# Patient Record
Sex: FEMALE | Race: WHITE | NOT HISPANIC OR LATINO | URBAN - METROPOLITAN AREA
[De-identification: names, ages, dates, MRNs, and addresses within clinical notes are randomized per-mention and may not be internally consistent; named-entity substitution may affect disease eponyms.]

---

## 2020-04-27 ENCOUNTER — INPATIENT (INPATIENT)
Facility: HOSPITAL | Age: 85
LOS: 3 days | Discharge: NURSING, REHAB OR LONG-TERM CARE | DRG: 535 | End: 2020-05-01
Attending: FAMILY MEDICINE | Admitting: FAMILY MEDICINE
Payer: MEDICARE

## 2020-04-27 VITALS
TEMPERATURE: 97 F | HEART RATE: 77 BPM | RESPIRATION RATE: 15 BRPM | SYSTOLIC BLOOD PRESSURE: 155 MMHG | DIASTOLIC BLOOD PRESSURE: 87 MMHG | OXYGEN SATURATION: 98 %

## 2020-04-27 DIAGNOSIS — S72.001A FRACTURE OF UNSPECIFIED PART OF NECK OF RIGHT FEMUR, INITIAL ENCOUNTER FOR CLOSED FRACTURE: ICD-10-CM

## 2020-04-27 DIAGNOSIS — I27.20 PULMONARY HYPERTENSION, UNSPECIFIED: ICD-10-CM

## 2020-04-27 DIAGNOSIS — J84.10 PULMONARY FIBROSIS, UNSPECIFIED: ICD-10-CM

## 2020-04-27 DIAGNOSIS — K59.00 CONSTIPATION, UNSPECIFIED: ICD-10-CM

## 2020-04-27 DIAGNOSIS — E11.9 TYPE 2 DIABETES MELLITUS WITHOUT COMPLICATIONS: ICD-10-CM

## 2020-04-27 DIAGNOSIS — Z29.9 ENCOUNTER FOR PROPHYLACTIC MEASURES, UNSPECIFIED: ICD-10-CM

## 2020-04-27 LAB
ALBUMIN SERPL ELPH-MCNC: 2.6 G/DL — LOW (ref 3.3–5)
ALP SERPL-CCNC: 74 U/L — SIGNIFICANT CHANGE UP (ref 40–120)
ALT FLD-CCNC: 21 U/L — SIGNIFICANT CHANGE UP (ref 12–78)
ANION GAP SERPL CALC-SCNC: 2 MMOL/L — LOW (ref 5–17)
APTT BLD: 29.1 SEC — SIGNIFICANT CHANGE UP (ref 28.5–37)
AST SERPL-CCNC: 19 U/L — SIGNIFICANT CHANGE UP (ref 15–37)
BASE EXCESS BLDV CALC-SCNC: 9.1 MMOL/L — HIGH (ref -2–2)
BILIRUB SERPL-MCNC: 0.6 MG/DL — SIGNIFICANT CHANGE UP (ref 0.2–1.2)
BLOOD GAS COMMENTS, VENOUS: SIGNIFICANT CHANGE UP
BUN SERPL-MCNC: 19 MG/DL — SIGNIFICANT CHANGE UP (ref 7–23)
CALCIUM SERPL-MCNC: 9 MG/DL — SIGNIFICANT CHANGE UP (ref 8.5–10.1)
CHLORIDE SERPL-SCNC: 107 MMOL/L — SIGNIFICANT CHANGE UP (ref 96–108)
CO2 SERPL-SCNC: 34 MMOL/L — HIGH (ref 22–31)
CREAT SERPL-MCNC: 0.64 MG/DL — SIGNIFICANT CHANGE UP (ref 0.5–1.3)
GLUCOSE SERPL-MCNC: 72 MG/DL — SIGNIFICANT CHANGE UP (ref 70–99)
HCO3 BLDV-SCNC: 30 MMOL/L — HIGH (ref 21–29)
HCT VFR BLD CALC: 32.1 % — LOW (ref 34.5–45)
HGB BLD-MCNC: 10.3 G/DL — LOW (ref 11.5–15.5)
HOROWITZ INDEX BLDV+IHG-RTO: 21 — SIGNIFICANT CHANGE UP
INR BLD: 1.33 RATIO — HIGH (ref 0.88–1.16)
MCHC RBC-ENTMCNC: 29.2 PG — SIGNIFICANT CHANGE UP (ref 27–34)
MCHC RBC-ENTMCNC: 32.1 GM/DL — SIGNIFICANT CHANGE UP (ref 32–36)
MCV RBC AUTO: 90.9 FL — SIGNIFICANT CHANGE UP (ref 80–100)
NRBC # BLD: 0 /100 WBCS — SIGNIFICANT CHANGE UP (ref 0–0)
PCO2 BLDV: 70 MMHG — HIGH (ref 35–50)
PH BLDV: 7.32 — LOW (ref 7.35–7.45)
PLATELET # BLD AUTO: 167 K/UL — SIGNIFICANT CHANGE UP (ref 150–400)
PO2 BLDV: <44 MMHG — SIGNIFICANT CHANGE UP (ref 25–45)
POTASSIUM SERPL-MCNC: 3.7 MMOL/L — SIGNIFICANT CHANGE UP (ref 3.5–5.3)
POTASSIUM SERPL-SCNC: 3.7 MMOL/L — SIGNIFICANT CHANGE UP (ref 3.5–5.3)
PROT SERPL-MCNC: 6.4 G/DL — SIGNIFICANT CHANGE UP (ref 6–8.3)
PROTHROM AB SERPL-ACNC: 15 SEC — HIGH (ref 10–12.9)
RBC # BLD: 3.53 M/UL — LOW (ref 3.8–5.2)
RBC # FLD: 15.2 % — HIGH (ref 10.3–14.5)
SAO2 % BLDV: 61 % — LOW (ref 67–88)
SARS-COV-2 RNA SPEC QL NAA+PROBE: SIGNIFICANT CHANGE UP
SODIUM SERPL-SCNC: 143 MMOL/L — SIGNIFICANT CHANGE UP (ref 135–145)
WBC # BLD: 7.8 K/UL — SIGNIFICANT CHANGE UP (ref 3.8–10.5)
WBC # FLD AUTO: 7.8 K/UL — SIGNIFICANT CHANGE UP (ref 3.8–10.5)

## 2020-04-27 PROCEDURE — 99285 EMERGENCY DEPT VISIT HI MDM: CPT

## 2020-04-27 PROCEDURE — 73502 X-RAY EXAM HIP UNI 2-3 VIEWS: CPT | Mod: 26,RT

## 2020-04-27 PROCEDURE — 93010 ELECTROCARDIOGRAM REPORT: CPT

## 2020-04-27 PROCEDURE — 99223 1ST HOSP IP/OBS HIGH 75: CPT

## 2020-04-27 PROCEDURE — 99223 1ST HOSP IP/OBS HIGH 75: CPT | Mod: GC,AI

## 2020-04-27 PROCEDURE — 71045 X-RAY EXAM CHEST 1 VIEW: CPT | Mod: 26

## 2020-04-27 PROCEDURE — 73552 X-RAY EXAM OF FEMUR 2/>: CPT | Mod: 26,RT

## 2020-04-27 RX ORDER — GLUCAGON INJECTION, SOLUTION 0.5 MG/.1ML
1 INJECTION, SOLUTION SUBCUTANEOUS ONCE
Refills: 0 | Status: DISCONTINUED | OUTPATIENT
Start: 2020-04-27 | End: 2020-05-01

## 2020-04-27 RX ORDER — ZINC SULFATE TAB 220 MG (50 MG ZINC EQUIVALENT) 220 (50 ZN) MG
220 TAB ORAL DAILY
Refills: 0 | Status: DISCONTINUED | OUTPATIENT
Start: 2020-04-27 | End: 2020-05-01

## 2020-04-27 RX ORDER — SODIUM CHLORIDE 9 MG/ML
1000 INJECTION, SOLUTION INTRAVENOUS
Refills: 0 | Status: DISCONTINUED | OUTPATIENT
Start: 2020-04-27 | End: 2020-04-28

## 2020-04-27 RX ORDER — PREGABALIN 225 MG/1
1000 CAPSULE ORAL DAILY
Refills: 0 | Status: DISCONTINUED | OUTPATIENT
Start: 2020-04-27 | End: 2020-05-01

## 2020-04-27 RX ORDER — GABAPENTIN 400 MG/1
100 CAPSULE ORAL DAILY
Refills: 0 | Status: DISCONTINUED | OUTPATIENT
Start: 2020-04-27 | End: 2020-05-01

## 2020-04-27 RX ORDER — SODIUM CHLORIDE 9 MG/ML
1000 INJECTION, SOLUTION INTRAVENOUS
Refills: 0 | Status: DISCONTINUED | OUTPATIENT
Start: 2020-04-27 | End: 2020-05-01

## 2020-04-27 RX ORDER — MORPHINE SULFATE 50 MG/1
2 CAPSULE, EXTENDED RELEASE ORAL EVERY 6 HOURS
Refills: 0 | Status: DISCONTINUED | OUTPATIENT
Start: 2020-04-27 | End: 2020-05-01

## 2020-04-27 RX ORDER — DEXTROSE 50 % IN WATER 50 %
25 SYRINGE (ML) INTRAVENOUS ONCE
Refills: 0 | Status: DISCONTINUED | OUTPATIENT
Start: 2020-04-27 | End: 2020-05-01

## 2020-04-27 RX ORDER — MORPHINE SULFATE 50 MG/1
2 CAPSULE, EXTENDED RELEASE ORAL ONCE
Refills: 0 | Status: DISCONTINUED | OUTPATIENT
Start: 2020-04-27 | End: 2020-04-27

## 2020-04-27 RX ORDER — TIOTROPIUM BROMIDE 18 UG/1
1 CAPSULE ORAL; RESPIRATORY (INHALATION) DAILY
Refills: 0 | Status: DISCONTINUED | OUTPATIENT
Start: 2020-04-27 | End: 2020-05-01

## 2020-04-27 RX ORDER — DEXTROSE 50 % IN WATER 50 %
15 SYRINGE (ML) INTRAVENOUS ONCE
Refills: 0 | Status: DISCONTINUED | OUTPATIENT
Start: 2020-04-27 | End: 2020-05-01

## 2020-04-27 RX ORDER — ALBUTEROL 90 UG/1
2 AEROSOL, METERED ORAL EVERY 6 HOURS
Refills: 0 | Status: DISCONTINUED | OUTPATIENT
Start: 2020-04-27 | End: 2020-05-01

## 2020-04-27 RX ORDER — DEXTROSE 50 % IN WATER 50 %
12.5 SYRINGE (ML) INTRAVENOUS ONCE
Refills: 0 | Status: DISCONTINUED | OUTPATIENT
Start: 2020-04-27 | End: 2020-05-01

## 2020-04-27 RX ORDER — ACETAMINOPHEN 500 MG
650 TABLET ORAL EVERY 6 HOURS
Refills: 0 | Status: DISCONTINUED | OUTPATIENT
Start: 2020-04-27 | End: 2020-05-01

## 2020-04-27 RX ORDER — TRAMADOL HYDROCHLORIDE 50 MG/1
25 TABLET ORAL EVERY 6 HOURS
Refills: 0 | Status: DISCONTINUED | OUTPATIENT
Start: 2020-04-27 | End: 2020-05-01

## 2020-04-27 RX ORDER — INSULIN LISPRO 100/ML
VIAL (ML) SUBCUTANEOUS
Refills: 0 | Status: DISCONTINUED | OUTPATIENT
Start: 2020-04-27 | End: 2020-05-01

## 2020-04-27 RX ORDER — LATANOPROST 0.05 MG/ML
1 SOLUTION/ DROPS OPHTHALMIC; TOPICAL AT BEDTIME
Refills: 0 | Status: DISCONTINUED | OUTPATIENT
Start: 2020-04-27 | End: 2020-05-01

## 2020-04-27 RX ORDER — ASCORBIC ACID 60 MG
500 TABLET,CHEWABLE ORAL DAILY
Refills: 0 | Status: DISCONTINUED | OUTPATIENT
Start: 2020-04-27 | End: 2020-05-01

## 2020-04-27 RX ADMIN — Medication 0: at 17:04

## 2020-04-27 RX ADMIN — LATANOPROST 1 DROP(S): 0.05 SOLUTION/ DROPS OPHTHALMIC; TOPICAL at 23:36

## 2020-04-27 RX ADMIN — MORPHINE SULFATE 2 MILLIGRAM(S): 50 CAPSULE, EXTENDED RELEASE ORAL at 12:29

## 2020-04-27 NOTE — H&P ADULT - PROBLEM SELECTOR PLAN 2
- Patient hx of diabetes. Will stop home medication and start low dose ISS with accucheks  - f/u hba1c   -cont to monitor

## 2020-04-27 NOTE — CONSULT NOTE ADULT - PROBLEM SELECTOR RECOMMENDATION 9
right hip fx - OP - OA - Pulm fibrosis - chronic lung disease - frailty - weakness -   DM and Pulm HTN  fall - right hip fx at [facility] -   spoke with DTR - got the hx from DTR  labs and imaging reviewed -   pain assessment  ortho eval pending  o2 support as noted - keep sat > 88 pct  I lisa if able to cooperate  extent of fibrosis is unknown - cxr shows chr disease - pt is minimally mobile at baseline -   pt is moderate to high risk for OR - discussed with the family and medical team  may need cardio eval - estefany op - hx of Pulm HTN and advanced age  monitor sat  will check VBG

## 2020-04-27 NOTE — H&P ADULT - PROBLEM SELECTOR PLAN 1
- Patient s/p fall on right hip at nursing home  - Right femur XR:  Right femur is intact.  - Right hip XR: Displaced intertrochanteric fracture right hip  - COVID 19 test, f/u  - Patient will be planned for surgery of right hip  - NPO after midnight  - Orthopedics consult, f/u recs - Patient s/p fall on right hip at nursing home  - Right femur XR:  Right femur is intact.  - Right hip XR: Displaced intertrochanteric fracture right hip  - COVID 19 test, f/u  - Patient will be planned for surgery of right hip  - NPO after midnight  - Ambulate with assistane  - Pain medication with tramadol 25mg q6 prn for moderate, morphine 2mg for severe pain  - Orthopedics consult, f/u recs

## 2020-04-27 NOTE — H&P ADULT - NSHPREVIEWOFSYSTEMS_GEN_ALL_CORE
CONSTITUTIONAL: denies fever, chills, fatigue, weakness  HEENT: denies blurred visions  SKIN: denies new lesions  CARDIOVASCULAR: denies chest pain, chest pressure, palpitations  RESPIRATORY: denies shortness of breath, sputum production  GASTROINTESTINAL: denies nausea, vomiting, diarrhea, abdominal pain  GENITOURINARY: denies dysuria, discharge  NEUROLOGICAL: denies numbness, headache, focal weakness  MUSCULOSKELETAL: admits to right hip pain  HEMATOLOGIC: denies gross bleeding, bruising  LYMPHATICS: denies extremity swelling

## 2020-04-27 NOTE — CONSULT NOTE ADULT - SUBJECTIVE AND OBJECTIVE BOX
Date/Time Patient Seen:  		  Referring MD:   Data Reviewed	       Patient is a 89y old  Female who presents with a chief complaint of Right hip injury (27 Apr 2020 13:13)      Subjective/HPI    in bed  seen and examined  vs and meds reviewed  labs reviewed  H and P reviewed  History and Physical:   Source of Information	Chart(s), Patient, Other Family Member  Outpatient Providers	PCP: Dr. Dixon Colon     Language:  · Patient/Family of Limited English Proficiency	Yes  · Language	[place]  · Please document name/ID of person providing translation, or document patient refusal	[id number]       History of Present Illness:  Reason for Admission: Right hip injury  History of Present Illness:   89 year old female with PMHx of pulmonary fibrosis, pulmonary HTN, T2DM, and constipation, BIBEMS to ED with right hip pain in setting of recent unwitnessed fall. Patient is a poor historian with little memory of the accident at the nursing home at [facility] in [place], [place]. Patient states the last thing she remembered was that she was using her walker to get to her room and found herself on the floor after. Patient states she thinks she hit her head but isn't sure. She states she found herself on the floor with severe pain in her right hip. Patient states this has never happened to her before. Of note patient is at [facility] in [place]. She denies headache, chest pain, fever and chills. Of note, patient was last hospitalized for Pneumonia in october 2019    PAST SURGICAL HISTORY:  No significant past surgical history.     Social History:  Social History (marital status, living situation, occupation, tobacco use, alcohol and drug use, and sexual history): Patient is at [facility]. She ambulates with a walker.  	Tobacco: Denies  	Alcohol: Denies  Illicit Drug use: Denies     Tobacco Screening:  · Core Measure Site	Yes  · Has the patient used tobacco in the past 30 days?	No    Risk Assessment:    Present on Admission:  Deep Venous Thrombosis	no  Pulmonary Embolus	no     Heart Failure:  Does this patient have a history of or has been diagnosed with heart failure? no.    ED Course: Vitals: Temp 97.4F oral, HR 77, /87, RR 15, O2 sat 98 on RA.  Labs: H/h 10.3/32.1, RDW 15.2, PT/INR 15/1.33, bicarb 34, anion gap 2, albumin 2.6  CXR: severe b/l intersitial fibrosis w/o focal infiltrate  Right femur XR:  Right femur is intact.  Right hip XR: Displaced intertrochanteric fracture right hip     PAST MEDICAL & SURGICAL HISTORY:  Constipation  Pulmonary hypertension  Pulmonary fibrosis  DM (diabetes mellitus)  No significant past surgical history        Medication list         MEDICATIONS  (STANDING):  ALBUTerol    90 MICROgram(s) HFA Inhaler 2 Puff(s) Inhalation every 6 hours  ascorbic acid 500 milliGRAM(s) Oral daily  cyanocobalamin 1000 MICROGram(s) Oral daily  dextrose 5%. 1000 milliLiter(s) (50 mL/Hr) IV Continuous <Continuous>  dextrose 50% Injectable 12.5 Gram(s) IV Push once  dextrose 50% Injectable 25 Gram(s) IV Push once  dextrose 50% Injectable 25 Gram(s) IV Push once  gabapentin 100 milliGRAM(s) Oral daily  insulin lispro (HumaLOG) corrective regimen sliding scale   SubCutaneous three times a day before meals  lactated ringers. 1000 milliLiter(s) (50 mL/Hr) IV Continuous <Continuous>  latanoprost 0.005% Ophthalmic Solution 1 Drop(s) Both EYES at bedtime  tiotropium 18 MICROgram(s) Capsule 1 Capsule(s) Inhalation daily  zinc sulfate 220 milliGRAM(s) Oral daily    MEDICATIONS  (PRN):  acetaminophen   Tablet .. 650 milliGRAM(s) Oral every 6 hours PRN Mild Pain (1 - 3)  bisacodyl Suppository 10 milliGRAM(s) Rectal daily PRN Constipation  dextrose 40% Gel 15 Gram(s) Oral once PRN Blood Glucose LESS THAN 70 milliGRAM(s)/deciliter  glucagon  Injectable 1 milliGRAM(s) IntraMuscular once PRN Glucose LESS THAN 70 milligrams/deciliter  morphine  - Injectable 2 milliGRAM(s) IV Push every 6 hours PRN Severe Pain (7 - 10)  traMADol 25 milliGRAM(s) Oral every 6 hours PRN Moderate Pain (4 - 6)         Vitals log        ICU Vital Signs Last 24 Hrs  T(C): 36.3 (27 Apr 2020 11:17), Max: 36.3 (27 Apr 2020 11:17)  T(F): 97.4 (27 Apr 2020 11:17), Max: 97.4 (27 Apr 2020 11:17)  HR: 77 (27 Apr 2020 11:17) (77 - 77)  BP: 155/87 (27 Apr 2020 11:17) (155/87 - 155/87)  BP(mean): --  ABP: --  ABP(mean): --  RR: 15 (27 Apr 2020 11:17) (15 - 15)  SpO2: 98% (27 Apr 2020 11:17) (98% - 98%)           Input and Output:  I&O's Detail      Lab Data                        10.3   7.80  )-----------( 167      ( 27 Apr 2020 12:00 )             32.1     04-27    143  |  107  |  19  ----------------------------<  72  3.7   |  34<H>  |  0.64    Ca    9.0      27 Apr 2020 12:00    TPro  6.4  /  Alb  2.6<L>  /  TBili  0.6  /  DBili  x   /  AST  19  /  ALT  21  /  AlkPhos  74  04-27            Review of Systems	    fall    Objective     Physical Examination    heart s1s2  lung dec BS  abd soft  head nc  extr no gross edema  verbal      Pertinent Lab findings & Imaging      Liriano:  NO   Adequate UO     I&O's Detail           Discussed with:     Cultures:	        Radiology  EXAM:  XR FEMUR 2 VIEWS RT                            PROCEDURE DATE:  04/27/2020          INTERPRETATION:  Right hip pain.    2 views right femur.    Impression: Displaced intertrochanteric fracture right hip described in a separate report. Remainder right femur is intact. Partially visualized fixation proximal tibia. Extensive arterial calcification.                SOO AQUINO M.D., ATTENDING RADIOLOGIST  This document has been electronically signed. Apr 27 2020  1:02PM

## 2020-04-27 NOTE — CONSULT NOTE ADULT - ASSESSMENT
Patient is admitted with a mechanical fall now status post right hip fracture planned for surgery. She is at increased risk for her planned surgery given her comorbidities including at least moderate aortic stenosis and pulmonary fibrosis.She has no evidence for active ischemia, heart failure, or dysrhythmias. She has no modifiable risk factors and is in optimal condition for her planned surgery        Recommend    Proceed with planned surgery using routine hemodynamic monitoring    Echocardiography can be considered postoperatively but will not change risk or management so as not necessary preoperatively    We'll follow closely with you

## 2020-04-27 NOTE — H&P ADULT - ATTENDING COMMENTS
Pt is high risk for intermediate risk procedure. Pt's records being sent from PCP. She had an ECHO done within the year. If results are recent enough she may proceed to the OR, otherwise she will need an updated ECHO. Pt is high risk for intermediate risk procedure. Pt's records being sent from PCP. She had an ECHO done within the year. If results are recent enough she may proceed to the OR, otherwise she will need an updated ECHO. Medical optimization pending cardio evaluation.

## 2020-04-27 NOTE — H&P ADULT - NSHPPHYSICALEXAM_GEN_ALL_CORE
T(C): 36.3 (04-27-20 @ 11:17), Max: 36.3 (04-27-20 @ 11:17)  HR: 77 (04-27-20 @ 11:17) (77 - 77)  BP: 155/87 (04-27-20 @ 11:17) (155/87 - 155/87)  RR: 15 (04-27-20 @ 11:17) (15 - 15)  SpO2: 98% (04-27-20 @ 11:17) (98% - 98%)    GENERAL: elderly frail female in mild distress, laying in bed with right hip pain  EYES: sclera clear, no exudates  ENMT: moist mucous membranes  NECK: supple, soft  LUNGS: good air entry bilaterally, clear to auscultation, no wheezing or rhonchi appreciated  HEART: soft S1/S2, regular rate and rhythm, no murmurs noted, no lower extremity edema  GASTROINTESTINAL: abdomen is soft, nontender, nondistended, normoactive bowel sounds  MUSCULOSKELETAL: RLE externally rotated and shortened, lower extremity with signs of muscle wasting  NEUROLOGIC: awake, alert, oriented x3, no obvious sensory deficits  PSYCHIATRIC: mood is good, affect is congruent, linear and logical thought process T(C): 36.3 (04-27-20 @ 11:17), Max: 36.3 (04-27-20 @ 11:17)  HR: 77 (04-27-20 @ 11:17) (77 - 77)  BP: 155/87 (04-27-20 @ 11:17) (155/87 - 155/87)  RR: 15 (04-27-20 @ 11:17) (15 - 15)  SpO2: 98% (04-27-20 @ 11:17) (98% - 98%)    GENERAL: elderly frail female in mild distress, laying in bed with right hip pain  EYES: sclera clear, no exudates  ENMT: moist mucous membranes  NECK: supple, soft  LUNGS: poor respiratory effort, clear to auscultation, no wheezing or rhonchi appreciated  HEART: soft S1/S2, regular rate and rhythm, 3/6 holosystolic murmur blowing in quality, no lower extremity edema  GASTROINTESTINAL: abdomen is soft, nontender, nondistended, normoactive bowel sounds  MUSCULOSKELETAL: RLE externally rotated and shortened, lower extremity with signs of muscle wasting  NEUROLOGIC: awake, alert, oriented x3, no obvious sensory deficits  PSYCHIATRIC: mood is good, affect is congruent, linear and logical thought process

## 2020-04-27 NOTE — ED PROVIDER NOTE - OBJECTIVE STATEMENT
89 yo white female with right hip pain since sustaining non syncopal fall yesterday. Landed on right hip. Pain developed immediately. Did not hit head. X-Rays were done which revealed hip fracture and thus sent patient here for further evaluation and management.

## 2020-04-27 NOTE — H&P ADULT - PROBLEM SELECTOR PLAN 3
- Patient with chronic hx of pulmonary hypertension 2/2 pulmonary fibrosis  - Patient does not follow a pulmonologist, currently on duonebs  - Will start albuterol and spiriva while in the hospital   -

## 2020-04-27 NOTE — H&P ADULT - NSICDXPASTMEDICALHX_GEN_ALL_CORE_FT
PAST MEDICAL HISTORY:  Constipation     DM (diabetes mellitus)     Pulmonary fibrosis     Pulmonary hypertension

## 2020-04-27 NOTE — H&P ADULT - NSHPSOCIALHISTORY_GEN_ALL_CORE
Patient is at [facility]. She ambulates with a walker.  Tobacco: Denies  Alcohol: Denies  Illicit Drug use: Denies

## 2020-04-27 NOTE — CONSULT NOTE ADULT - ASSESSMENT
A/P: 89y Female with R IT hip fracture  Pain control  NWB R LE, bedrest  NPO  IVF while NPO  Hold all chemical DVT ppx  FU labs/imaging  Ca/Vit D  FU COVID test  Outpt osteoporosis workup  Admit to medical team  Medical clearance/optimization for OR  Will discuss with attending and advise if plan changes  INCOMPLETE NOTE A/P: 89y Female with R IT hip fracture  Plan for OR tomorrow for IMN  Pain control  NWB R LE, bedrest  NPO  IVF while NPO  Hold all chemical DVT ppx  FU labs/imaging  Ca/Vit D  FU COVID test  Outpt osteoporosis workup  Admit to medical team  Medical clearance/optimization for OR  Will discuss with attending and advise if plan changes

## 2020-04-27 NOTE — ED ADULT NURSE NOTE - OBJECTIVE STATEMENT
pt to er s/p fall upon arrival is awake and alert has right leg and hip pain right leg shortened and rotated positive pedal pulse resp even unlabored pt is able to answer basic questions

## 2020-04-27 NOTE — ED ADULT NURSE REASSESSMENT NOTE - NS ED NURSE REASSESS COMMENT FT1
pt remains in er admitted to hospital given food encouraged to eat positioned for comfort awaiting bed assignment

## 2020-04-27 NOTE — CONSULT NOTE ADULT - SUBJECTIVE AND OBJECTIVE BOX
[facility] Consultation    CHIEF COMPLAINT: Patient is a 89y old  Female who presents with a chief complaint of Right hip injury (27 Apr 2020 15:39)      HPI:  89 year old female with PMHx of pulmonary fibrosis, pulmonary HTN, T2DM, Aortic Stenosis and mitral regurgitation, and constipation, BIBEMS to ED with right hip pain in setting of recent unwitnessed fall. Patient is a poor historian with little memory of the accident at the nursing home at [facility] in [place], [place]. Patient states the last thing she remembered was that she was using her walker to get to her room and found herself on the floor after. Patient states she thinks she hit her head but isn't sure. She states she found herself on the floor with severe pain in her right hip. Patient states this has never happened to her before. Of note patient is at [facility] in [place]. She denies headache, chest pain, fever and chills. Of note, patient was last hospitalized for Pneumonia in october 2019    ED Course: Vitals: Temp 97.4F oral, HR 77, /87, RR 15, O2 sat 98 on RA.  Labs: H/h 10.3/32.1, RDW 15.2, PT/INR 15/1.33, bicarb 34, anion gap 2, albumin 2.6  CXR: severe b/l intersitial fibrosis w/o focal infiltrate  Right femur XR:  Right femur is intact.  Right hip XR: Displaced intertrochanteric fracture right hip (27 Apr 2020 13:13)      PAST MEDICAL & SURGICAL HISTORY:  Constipation  Pulmonary hypertension  Pulmonary fibrosis  DM (diabetes mellitus)  No significant past surgical history      SOCIAL HISTORY: no tob/etoh    FAMILY HISTORY: no CAD      MEDICATIONS  (STANDING):  ALBUTerol    90 MICROgram(s) HFA Inhaler 2 Puff(s) Inhalation every 6 hours  ascorbic acid 500 milliGRAM(s) Oral daily  cyanocobalamin 1000 MICROGram(s) Oral daily  dextrose 5%. 1000 milliLiter(s) (50 mL/Hr) IV Continuous <Continuous>  dextrose 50% Injectable 12.5 Gram(s) IV Push once  dextrose 50% Injectable 25 Gram(s) IV Push once  dextrose 50% Injectable 25 Gram(s) IV Push once  gabapentin 100 milliGRAM(s) Oral daily  insulin lispro (HumaLOG) corrective regimen sliding scale   SubCutaneous three times a day before meals  lactated ringers. 1000 milliLiter(s) (50 mL/Hr) IV Continuous <Continuous>  latanoprost 0.005% Ophthalmic Solution 1 Drop(s) Both EYES at bedtime  tiotropium 18 MICROgram(s) Capsule 1 Capsule(s) Inhalation daily  zinc sulfate 220 milliGRAM(s) Oral daily    MEDICATIONS  (PRN):  acetaminophen   Tablet .. 650 milliGRAM(s) Oral every 6 hours PRN Mild Pain (1 - 3)  bisacodyl Suppository 10 milliGRAM(s) Rectal daily PRN Constipation  dextrose 40% Gel 15 Gram(s) Oral once PRN Blood Glucose LESS THAN 70 milliGRAM(s)/deciliter  glucagon  Injectable 1 milliGRAM(s) IntraMuscular once PRN Glucose LESS THAN 70 milligrams/deciliter  morphine  - Injectable 2 milliGRAM(s) IV Push every 6 hours PRN Severe Pain (7 - 10)  traMADol 25 milliGRAM(s) Oral every 6 hours PRN Moderate Pain (4 - 6)      Allergies    No Known Allergies    Intolerances        REVIEW OF SYSTEMS:    CONSTITUTIONAL: pos weakness, no fevers or chills  EYES: No visual changes, No diplopia  ENMT: No throat pain , No exudate  NECK: No pain or stiffness  RESPIRATORY: No cough, wheezing, hemoptysis; No shortness of breath  CARDIOVASCULAR: No chest pain or palpitations  GASTROINTESTINAL: No abdominal pain. No nausea, vomiting, or hematemesis; No diarrhea or constipation. No melena or hematochezia.  GENITOURINARY: No dysuria, frequency or hematuria  NEUROLOGICAL: No numbness   SKIN: No itching or rash  All other review of systems is negative unless indicated above    VITAL SIGNS:   Vital Signs Last 24 Hrs  T(C): 36.3 (27 Apr 2020 11:17), Max: 36.3 (27 Apr 2020 11:17)  T(F): 97.4 (27 Apr 2020 11:17), Max: 97.4 (27 Apr 2020 11:17)  HR: 84 (27 Apr 2020 17:03) (77 - 84)  BP: 171/77 (27 Apr 2020 17:03) (155/87 - 171/77)  BP(mean): --  RR: 18 (27 Apr 2020 17:03) (15 - 18)  SpO2: 98% (27 Apr 2020 17:03) (98% - 98%)          PHYSICAL EXAM:    Constitutional: NAD, frail  Eyes:  Pupils round, no lesions  ENMT: no exudate or erythema  Pulmonary: Non-labored, breath sounds are clear bilaterally, No wheezing, rales or rhonchi  Cardiovascular: PMI not palpable Regular S1 and soft S2, 3/6 mid syst murmer of AS, no rubs, gallops or clicks  Gastrointestinal: Bowel Sounds present, soft, nontender.   Lymph: No peripheral edema. No cervical lymphadenopathy.  Neurological: Alert, no focal deficits  Skin: No rashes. Changes of chronic venous stasis. No cyanosis.  Psych:  Mood & affect appropriate    LABS: All Labs Reviewed:                        10.3   7.80  )-----------( 167      ( 27 Apr 2020 12:00 )             32.1     27 Apr 2020 12:00    143    |  107    |  19     ----------------------------<  72     3.7     |  34     |  0.64     Ca    9.0        27 Apr 2020 12:00    TPro  6.4    /  Alb  2.6    /  TBili  0.6    /  DBili  x      /  AST  19     /  ALT  21     /  AlkPhos  74     27 Apr 2020 12:00    PT/INR - ( 27 Apr 2020 12:00 )   PT: 15.0 sec;   INR: 1.33 ratio         PTT - ( 27 Apr 2020 12:00 )  PTT:29.1 sec      ECG: SR, LVH, PRWP    < from: Xray Chest 1 View-PORTABLE IMMEDIATE (04.27.20 @ 12:57) >    EXAM:  XR CHEST PORTABLE IMMED 1V                            PROCEDURE DATE:  04/27/2020          INTERPRETATION:    Examination: XR CHEST IMMEDIATE    History: , pre op  No prior  Findings:  Heart magnified by projection. Atherosclerotic aorta. There is severe diffuse bilateral nearly symmetric interstitial fibrosis. No definite focal infiltrate. No bryan parenchymal consolidation or pleural effusion.    Impression:  1.  Severe bilateral interstitial fibrosis without gross focal infiltrate.      DISCRETE X-RAY DATA:  Percent of LEFT lung opacification: %  Percent of RIGHT lung opacification: %  Change in lung opacification from most recent x-ray (<=3 days): No Prior  Change from prior dated 3 or more days (same admission): No Prior                  SOO AQUINO M.D., ATTENDING RADIOLOGIST  This document has been electronically signed. Apr 27 2020 12:59PM                < end of copied text >

## 2020-04-27 NOTE — H&P ADULT - ASSESSMENT
89 year old female with PMHx of pulmonary fibrosis, pulmonary HTN, T2DM, and constipation, BIBEMS to ED with right hip pain in setting of recent unwitnessed fall. Admitted with displaced intertrochanteric fracture of the right hip.

## 2020-04-27 NOTE — CONSULT NOTE ADULT - SUBJECTIVE AND OBJECTIVE BOX
89y Female home ambulator with assistive devices presents c/o R hip pain and inability to ambulate s/p mechanical fall yesterday at nursing home. Admits to HS but denies any LOC. Denies numbness/tingling. Denies fever/chills. Denies pain/injury elsewhere. Admits to previous R Tibial Plateau ORIF by OSH. No other orthopedic concerns at this time.    HEALTH ISSUES - PROBLEM Dx:  Need for prophylactic measure: Need for prophylactic measure  Constipation: Constipation  Pulmonary fibrosis: Pulmonary fibrosis  Pulmonary hypertension: Pulmonary hypertension  DM (diabetes mellitus): DM (diabetes mellitus)  Closed fracture of right hip, initial encounter: Closed fracture of right hip, initial encounter    MEDICATIONS  (STANDING):  ALBUTerol    90 MICROgram(s) HFA Inhaler 2 Puff(s) Inhalation every 6 hours  ascorbic acid 500 milliGRAM(s) Oral daily  cyanocobalamin 1000 MICROGram(s) Oral daily  dextrose 5%. 1000 milliLiter(s) IV Continuous <Continuous>  dextrose 50% Injectable 12.5 Gram(s) IV Push once  dextrose 50% Injectable 25 Gram(s) IV Push once  dextrose 50% Injectable 25 Gram(s) IV Push once  gabapentin 100 milliGRAM(s) Oral daily  insulin lispro (HumaLOG) corrective regimen sliding scale   SubCutaneous three times a day before meals  lactated ringers. 1000 milliLiter(s) IV Continuous <Continuous>  latanoprost 0.005% Ophthalmic Solution 1 Drop(s) Both EYES at bedtime  tiotropium 18 MICROgram(s) Capsule 1 Capsule(s) Inhalation daily  zinc sulfate 220 milliGRAM(s) Oral daily    Allergies    No Known Allergies    Intolerances                            10.3   7.80  )-----------( 167      ( 27 Apr 2020 12:00 )             32.1     27 Apr 2020 12:00    143    |  107    |  19     ----------------------------<  72     3.7     |  34     |  0.64     Ca    9.0        27 Apr 2020 12:00    TPro  6.4    /  Alb  2.6    /  TBili  0.6    /  DBili  x      /  AST  19     /  ALT  21     /  AlkPhos  74     27 Apr 2020 12:00    PT/INR - ( 27 Apr 2020 12:00 )   PT: 15.0 sec;   INR: 1.33 ratio         PTT - ( 27 Apr 2020 12:00 )  PTT:29.1 sec  Vital Signs Last 24 Hrs  T(C): 36.3 (04-27-20 @ 11:17), Max: 36.3 (04-27-20 @ 11:17)  T(F): 97.4 (04-27-20 @ 11:17), Max: 97.4 (04-27-20 @ 11:17)  HR: 77 (04-27-20 @ 11:17) (77 - 77)  BP: 155/87 (04-27-20 @ 11:17) (155/87 - 155/87)  BP(mean): --  RR: 15 (04-27-20 @ 11:17) (15 - 15)  SpO2: 98% (04-27-20 @ 11:17) (98% - 98%)    Imaging: XR demonstrates displaced R IT hip fracture    Physical Exam  Gen: NAD  R LE: skin intact, healed scar over knee/ant tib; unable to SLR R LE, + log roll R LE, +ttp hip/groin, no ttp elsewhere, +ehl/fhl/ta/gs function, no calf ttp, dp/pt pulse intact, compartments soft and compressible    Secondary Survey:   LLE/RUE/LUE: No TTP over bony prominences, SILT, palpable pulses, full/painless range of motion, compartments soft    Spine: No bony tenderness. No palpable stepoffs.

## 2020-04-27 NOTE — H&P ADULT - PROBLEM SELECTOR PLAN 6
IMPROVE VTE Individual Risk Assessment  RISK                                                                Points  [  ] Previous VTE                                                  3  [  ] Thrombophilia                                               2  [ x ] Lower limb paralysis                                      2        (unable to hold up >15 seconds)    [  ] Current Cancer                                              2         (within 6 months)  [  ] Immobilization > 24 hrs                                1  [  ] ICU/CCU stay > 24 hours                              1  [ x ] Age > 60                                                      1  IMPROVE VTE Score _____3____  IMPROVE Score 0-1: Low Risk, No VTE prophylaxis required for most patients, encourage ambulation.   IMPROVE Score 2-3: At risk, pharmacologic VTE prophylaxis is indicated for most patients (in the absence of a contraindication)  IMPROVE Score > or = 4: High Risk, pharmacologic VTE prophylaxis is indicated for most patients (in the absence of a contraindication)  DVT ppx: SCDs pre-op, can start ppx post-op per Ortho

## 2020-04-27 NOTE — H&P ADULT - PROBLEM SELECTOR PLAN 5
Pt may be planned for surgery tomorrow, will hold anticoagulation. SCD's    IMPROVE VTE Individual Risk Assessment  RISK                                                                Points  [  ] Previous VTE                                                  3  [  ] Thrombophilia                                               2  [ x ] Lower limb paralysis                                      2        (unable to hold up >15 seconds)    [  ] Current Cancer                                              2         (within 6 months)  [  ] Immobilization > 24 hrs                                1  [  ] ICU/CCU stay > 24 hours                              1  [ x ] Age > 60                                                      1  IMPROVE VTE Score _____3____  IMPROVE Score 0-1: Low Risk, No VTE prophylaxis required for most patients, encourage ambulation.   IMPROVE Score 2-3: At risk, pharmacologic VTE prophylaxis is indicated for most patients (in the absence of a contraindication)  IMPROVE Score > or = 4: High Risk, pharmacologic VTE prophylaxis is indicated for most patients (in the absence of a contraindication)  DVT ppx: SCDs pre-op, can start ppx post-op per Ortho

## 2020-04-27 NOTE — ED PROVIDER NOTE - CONSTITUTIONAL, MLM
normal... Weak and thin appearing elderly female, awake, alert, oriented to person, place, in mild apparent distress.

## 2020-04-28 LAB
A1C WITH ESTIMATED AVERAGE GLUCOSE RESULT: 5.9 % — HIGH (ref 4–5.6)
ALBUMIN SERPL ELPH-MCNC: 2.5 G/DL — LOW (ref 3.3–5)
ALP SERPL-CCNC: 76 U/L — SIGNIFICANT CHANGE UP (ref 40–120)
ALT FLD-CCNC: 19 U/L — SIGNIFICANT CHANGE UP (ref 12–78)
ANION GAP SERPL CALC-SCNC: 6 MMOL/L — SIGNIFICANT CHANGE UP (ref 5–17)
ANION GAP SERPL CALC-SCNC: 8 MMOL/L — SIGNIFICANT CHANGE UP (ref 5–17)
APPEARANCE UR: CLEAR — SIGNIFICANT CHANGE UP
APTT BLD: 28 SEC — LOW (ref 28.5–37)
APTT BLD: 28.1 SEC — LOW (ref 28.5–37)
AST SERPL-CCNC: 19 U/L — SIGNIFICANT CHANGE UP (ref 15–37)
BASOPHILS # BLD AUTO: 0.04 K/UL — SIGNIFICANT CHANGE UP (ref 0–0.2)
BASOPHILS NFR BLD AUTO: 0.5 % — SIGNIFICANT CHANGE UP (ref 0–2)
BILIRUB SERPL-MCNC: 1 MG/DL — SIGNIFICANT CHANGE UP (ref 0.2–1.2)
BILIRUB UR-MCNC: NEGATIVE — SIGNIFICANT CHANGE UP
BUN SERPL-MCNC: 17 MG/DL — SIGNIFICANT CHANGE UP (ref 7–23)
BUN SERPL-MCNC: 18 MG/DL — SIGNIFICANT CHANGE UP (ref 7–23)
CALCIUM SERPL-MCNC: 8.7 MG/DL — SIGNIFICANT CHANGE UP (ref 8.5–10.1)
CALCIUM SERPL-MCNC: 8.7 MG/DL — SIGNIFICANT CHANGE UP (ref 8.5–10.1)
CHLORIDE SERPL-SCNC: 103 MMOL/L — SIGNIFICANT CHANGE UP (ref 96–108)
CHLORIDE SERPL-SCNC: 104 MMOL/L — SIGNIFICANT CHANGE UP (ref 96–108)
CO2 SERPL-SCNC: 29 MMOL/L — SIGNIFICANT CHANGE UP (ref 22–31)
CO2 SERPL-SCNC: 34 MMOL/L — HIGH (ref 22–31)
COLOR SPEC: YELLOW — SIGNIFICANT CHANGE UP
CREAT SERPL-MCNC: 0.6 MG/DL — SIGNIFICANT CHANGE UP (ref 0.5–1.3)
CREAT SERPL-MCNC: 0.61 MG/DL — SIGNIFICANT CHANGE UP (ref 0.5–1.3)
DIFF PNL FLD: NEGATIVE — SIGNIFICANT CHANGE UP
EOSINOPHIL # BLD AUTO: 0.22 K/UL — SIGNIFICANT CHANGE UP (ref 0–0.5)
EOSINOPHIL NFR BLD AUTO: 2.6 % — SIGNIFICANT CHANGE UP (ref 0–6)
ESTIMATED AVERAGE GLUCOSE: 123 MG/DL — HIGH (ref 68–114)
GLUCOSE SERPL-MCNC: 118 MG/DL — HIGH (ref 70–99)
GLUCOSE SERPL-MCNC: 119 MG/DL — HIGH (ref 70–99)
GLUCOSE UR QL: NEGATIVE — SIGNIFICANT CHANGE UP
HCT VFR BLD CALC: 33.6 % — LOW (ref 34.5–45)
HCT VFR BLD CALC: 33.6 % — LOW (ref 34.5–45)
HGB BLD-MCNC: 10.8 G/DL — LOW (ref 11.5–15.5)
HGB BLD-MCNC: 10.9 G/DL — LOW (ref 11.5–15.5)
IMM GRANULOCYTES NFR BLD AUTO: 0.8 % — SIGNIFICANT CHANGE UP (ref 0–1.5)
INR BLD: 1.44 RATIO — HIGH (ref 0.88–1.16)
INR BLD: 1.47 RATIO — HIGH (ref 0.88–1.16)
KETONES UR-MCNC: NEGATIVE — SIGNIFICANT CHANGE UP
LEUKOCYTE ESTERASE UR-ACNC: ABNORMAL
LYMPHOCYTES # BLD AUTO: 0.98 K/UL — LOW (ref 1–3.3)
LYMPHOCYTES # BLD AUTO: 11.4 % — LOW (ref 13–44)
MCHC RBC-ENTMCNC: 29 PG — SIGNIFICANT CHANGE UP (ref 27–34)
MCHC RBC-ENTMCNC: 29.2 PG — SIGNIFICANT CHANGE UP (ref 27–34)
MCHC RBC-ENTMCNC: 32.1 GM/DL — SIGNIFICANT CHANGE UP (ref 32–36)
MCHC RBC-ENTMCNC: 32.4 GM/DL — SIGNIFICANT CHANGE UP (ref 32–36)
MCV RBC AUTO: 90.1 FL — SIGNIFICANT CHANGE UP (ref 80–100)
MCV RBC AUTO: 90.3 FL — SIGNIFICANT CHANGE UP (ref 80–100)
MONOCYTES # BLD AUTO: 0.62 K/UL — SIGNIFICANT CHANGE UP (ref 0–0.9)
MONOCYTES NFR BLD AUTO: 7.2 % — SIGNIFICANT CHANGE UP (ref 2–14)
NEUTROPHILS # BLD AUTO: 6.66 K/UL — SIGNIFICANT CHANGE UP (ref 1.8–7.4)
NEUTROPHILS NFR BLD AUTO: 77.5 % — HIGH (ref 43–77)
NITRITE UR-MCNC: NEGATIVE — SIGNIFICANT CHANGE UP
NRBC # BLD: 0 /100 WBCS — SIGNIFICANT CHANGE UP (ref 0–0)
NRBC # BLD: 0 /100 WBCS — SIGNIFICANT CHANGE UP (ref 0–0)
PH UR: 6 — SIGNIFICANT CHANGE UP (ref 5–8)
PLATELET # BLD AUTO: 173 K/UL — SIGNIFICANT CHANGE UP (ref 150–400)
PLATELET # BLD AUTO: 175 K/UL — SIGNIFICANT CHANGE UP (ref 150–400)
POTASSIUM SERPL-MCNC: 3.9 MMOL/L — SIGNIFICANT CHANGE UP (ref 3.5–5.3)
POTASSIUM SERPL-MCNC: 4.1 MMOL/L — SIGNIFICANT CHANGE UP (ref 3.5–5.3)
POTASSIUM SERPL-SCNC: 3.9 MMOL/L — SIGNIFICANT CHANGE UP (ref 3.5–5.3)
POTASSIUM SERPL-SCNC: 4.1 MMOL/L — SIGNIFICANT CHANGE UP (ref 3.5–5.3)
PROT SERPL-MCNC: 6.7 G/DL — SIGNIFICANT CHANGE UP (ref 6–8.3)
PROT UR-MCNC: 100
PROTHROM AB SERPL-ACNC: 16.3 SEC — HIGH (ref 10–12.9)
PROTHROM AB SERPL-ACNC: 16.7 SEC — HIGH (ref 10–12.9)
RBC # BLD: 3.72 M/UL — LOW (ref 3.8–5.2)
RBC # BLD: 3.73 M/UL — LOW (ref 3.8–5.2)
RBC # FLD: 15 % — HIGH (ref 10.3–14.5)
RBC # FLD: 15.2 % — HIGH (ref 10.3–14.5)
SODIUM SERPL-SCNC: 140 MMOL/L — SIGNIFICANT CHANGE UP (ref 135–145)
SODIUM SERPL-SCNC: 144 MMOL/L — SIGNIFICANT CHANGE UP (ref 135–145)
SP GR SPEC: 1.02 — SIGNIFICANT CHANGE UP (ref 1.01–1.02)
UROBILINOGEN FLD QL: NEGATIVE — SIGNIFICANT CHANGE UP
WBC # BLD: 7.11 K/UL — SIGNIFICANT CHANGE UP (ref 3.8–10.5)
WBC # BLD: 8.59 K/UL — SIGNIFICANT CHANGE UP (ref 3.8–10.5)
WBC # FLD AUTO: 7.11 K/UL — SIGNIFICANT CHANGE UP (ref 3.8–10.5)
WBC # FLD AUTO: 8.59 K/UL — SIGNIFICANT CHANGE UP (ref 3.8–10.5)

## 2020-04-28 PROCEDURE — 93306 TTE W/DOPPLER COMPLETE: CPT | Mod: 26

## 2020-04-28 PROCEDURE — 71275 CT ANGIOGRAPHY CHEST: CPT | Mod: 26

## 2020-04-28 PROCEDURE — 99233 SBSQ HOSP IP/OBS HIGH 50: CPT | Mod: GC

## 2020-04-28 PROCEDURE — 99232 SBSQ HOSP IP/OBS MODERATE 35: CPT

## 2020-04-28 RX ORDER — PREGABALIN 225 MG/1
1 CAPSULE ORAL
Qty: 0 | Refills: 0 | DISCHARGE

## 2020-04-28 RX ORDER — ACETAMINOPHEN 500 MG
0 TABLET ORAL
Qty: 0 | Refills: 0 | DISCHARGE

## 2020-04-28 RX ORDER — HYDROCORTISONE 1 %
0 OINTMENT (GRAM) TOPICAL
Qty: 0 | Refills: 0 | DISCHARGE

## 2020-04-28 RX ORDER — BRINZOLAMIDE/BRIMONIDINE TARTRATE 10; 2 MG/ML; MG/ML
1 SUSPENSION/ DROPS OPHTHALMIC
Qty: 0 | Refills: 0 | DISCHARGE

## 2020-04-28 RX ORDER — CHOLECALCIFEROL (VITAMIN D3) 125 MCG
1 CAPSULE ORAL
Qty: 0 | Refills: 0 | DISCHARGE

## 2020-04-28 RX ORDER — ASCORBIC ACID 60 MG
1 TABLET,CHEWABLE ORAL
Qty: 0 | Refills: 0 | DISCHARGE

## 2020-04-28 RX ORDER — LATANOPROST 0.05 MG/ML
1 SOLUTION/ DROPS OPHTHALMIC; TOPICAL
Qty: 0 | Refills: 0 | DISCHARGE

## 2020-04-28 RX ORDER — MENTHOL AND METHYL SALICYLATE 10; 30 G/100G; G/100G
0 STICK TOPICAL
Qty: 0 | Refills: 0 | DISCHARGE

## 2020-04-28 RX ORDER — IPRATROPIUM/ALBUTEROL SULFATE 18-103MCG
3 AEROSOL WITH ADAPTER (GRAM) INHALATION
Qty: 0 | Refills: 0 | DISCHARGE

## 2020-04-28 RX ORDER — MAGNESIUM HYDROXIDE 400 MG/1
0 TABLET, CHEWABLE ORAL
Qty: 0 | Refills: 0 | DISCHARGE

## 2020-04-28 RX ORDER — ENOXAPARIN SODIUM 100 MG/ML
40 INJECTION SUBCUTANEOUS DAILY
Refills: 0 | Status: DISCONTINUED | OUTPATIENT
Start: 2020-04-28 | End: 2020-05-01

## 2020-04-28 RX ORDER — GABAPENTIN 400 MG/1
0 CAPSULE ORAL
Qty: 0 | Refills: 0 | DISCHARGE

## 2020-04-28 RX ORDER — ZINC SULFATE TAB 220 MG (50 MG ZINC EQUIVALENT) 220 (50 ZN) MG
0 TAB ORAL
Qty: 0 | Refills: 0 | DISCHARGE

## 2020-04-28 RX ADMIN — ENOXAPARIN SODIUM 40 MILLIGRAM(S): 100 INJECTION SUBCUTANEOUS at 17:13

## 2020-04-28 RX ADMIN — ALBUTEROL 2 PUFF(S): 90 AEROSOL, METERED ORAL at 17:14

## 2020-04-28 RX ADMIN — ALBUTEROL 2 PUFF(S): 90 AEROSOL, METERED ORAL at 07:18

## 2020-04-28 RX ADMIN — LATANOPROST 1 DROP(S): 0.05 SOLUTION/ DROPS OPHTHALMIC; TOPICAL at 21:31

## 2020-04-28 RX ADMIN — ALBUTEROL 2 PUFF(S): 90 AEROSOL, METERED ORAL at 21:32

## 2020-04-28 RX ADMIN — TIOTROPIUM BROMIDE 1 CAPSULE(S): 18 CAPSULE ORAL; RESPIRATORY (INHALATION) at 07:18

## 2020-04-28 RX ADMIN — ALBUTEROL 2 PUFF(S): 90 AEROSOL, METERED ORAL at 11:30

## 2020-04-28 RX ADMIN — TRAMADOL HYDROCHLORIDE 25 MILLIGRAM(S): 50 TABLET ORAL at 17:13

## 2020-04-28 RX ADMIN — Medication 1: at 17:13

## 2020-04-28 RX ADMIN — SODIUM CHLORIDE 50 MILLILITER(S): 9 INJECTION, SOLUTION INTRAVENOUS at 00:00

## 2020-04-28 NOTE — PROGRESS NOTE ADULT - SUBJECTIVE AND OBJECTIVE BOX
Patient is a 89y old  Female who presents with a chief complaint of Right hip injury (2020 08:23)        HPI:  89 year old female with PMHx of pulmonary fibrosis, pulmonary HTN, T2DM, Aortic Stenosis and mitral regurgitation, and constipation, BIBEMS to ED with right hip pain in setting of recent unwitnessed fall. Patient is a poor historian with little memory of the accident at the nursing home at [facility] in [place], [place]. Patient states the last thing she remembered was that she was using her walker to get to her room and found herself on the floor after. Patient states she thinks she hit her head but isn't sure. She states she found herself on the floor with severe pain in her right hip. Patient states this has never happened to her before. Of note patient is at [facility] in [place]. She denies headache, chest pain, fever and chills. Of note, patient was last hospitalized for Pneumonia in 2019    ED Course: Vitals: Temp 97.4F oral, HR 77, /87, RR 15, O2 sat 98 on RA.  Labs: H/h 10.3/32.1, RDW 15.2, PT/INR 15/1.33, bicarb 34, anion gap 2, albumin 2.6  CXR: severe b/l intersitial fibrosis w/o focal infiltrate  Right femur XR:  Right femur is intact.  Right hip XR: Displaced intertrochanteric fracture right hip (2020 13:13)      SUBJECTIVE & OBJECTIVE: Pt seen and examined at bedside. on 50% 02     PHYSICAL EXAM:  T(C): 36.4 (20 @ 05:05), Max: 37.2 (20 @ 20:22)  HR: 81 (20 @ 05:05) (77 - 97)  BP: 158/84 (20 @ 05:05) (155/87 - 171/77)  RR: 22 (20 @ 05:05) (15 - 40)  SpO2: 100% (20 @ 05:05) (82% - 100%)  Wt(kg): --   GENERAL: confused, on 50 % 02  HEAD:  Atraumatic, Normocephalic  NERVOUS SYSTEM:  Alert   CHEST/LUNG: decrease air entr yast the abses   HEART: Regular rate and rhythm; No murmurs, rubs, or gallops  ABDOMEN: Soft, Nontender, Nondistended; Bowel sounds present  EXTREMITIES:  right hup fracture      MEDICATIONS  (STANDING):  ALBUTerol    90 MICROgram(s) HFA Inhaler 2 Puff(s) Inhalation every 6 hours  ascorbic acid 500 milliGRAM(s) Oral daily  cyanocobalamin 1000 MICROGram(s) Oral daily  dextrose 5%. 1000 milliLiter(s) (50 mL/Hr) IV Continuous <Continuous>  dextrose 50% Injectable 12.5 Gram(s) IV Push once  dextrose 50% Injectable 25 Gram(s) IV Push once  dextrose 50% Injectable 25 Gram(s) IV Push once  gabapentin 100 milliGRAM(s) Oral daily  insulin lispro (HumaLOG) corrective regimen sliding scale   SubCutaneous three times a day before meals  lactated ringers. 1000 milliLiter(s) (50 mL/Hr) IV Continuous <Continuous>  latanoprost 0.005% Ophthalmic Solution 1 Drop(s) Both EYES at bedtime  tiotropium 18 MICROgram(s) Capsule 1 Capsule(s) Inhalation daily  zinc sulfate 220 milliGRAM(s) Oral daily    MEDICATIONS  (PRN):  acetaminophen   Tablet .. 650 milliGRAM(s) Oral every 6 hours PRN Mild Pain (1 - 3)  bisacodyl Suppository 10 milliGRAM(s) Rectal daily PRN Constipation  dextrose 40% Gel 15 Gram(s) Oral once PRN Blood Glucose LESS THAN 70 milliGRAM(s)/deciliter  glucagon  Injectable 1 milliGRAM(s) IntraMuscular once PRN Glucose LESS THAN 70 milligrams/deciliter  morphine  - Injectable 2 milliGRAM(s) IV Push every 6 hours PRN Severe Pain (7 - 10)  traMADol 25 milliGRAM(s) Oral every 6 hours PRN Moderate Pain (4 - 6)      LABS:                        10.8   7.11  )-----------( 173      ( 2020 04:58 )             33.6         144  |  104  |  18  ----------------------------<  118<H>  4.1   |  34<H>  |  0.60    Ca    8.7      2020 04:58    TPro  6.4  /  Alb  2.6<L>  /  TBili  0.6  /  DBili  x   /  AST  19  /  ALT  21  /  AlkPhos  74  04-27    PT/INR - ( 2020 04:58 )   PT: 16.3 sec;   INR: 1.44 ratio         PTT - ( 2020 04:58 )  PTT:28.1 sec  Urinalysis Basic - ( 2020 08:00 )    Color: Yellow / Appearance: Clear / S.020 / pH: x  Gluc: x / Ketone: Negative  / Bili: Negative / Urobili: Negative   Blood: x / Protein: 100 / Nitrite: Negative   Leuk Esterase: Small / RBC: Negative /HPF / WBC 11-25   Sq Epi: x / Non Sq Epi: Occasional / Bacteria: Few        CAPILLARY BLOOD GLUCOSE      POCT Blood Glucose.: 116 mg/dL (2020 06:13)  POCT Blood Glucose.: 118 mg/dL (2020 21:21)  POCT Blood Glucose.: 152 mg/dL (2020 19:20)  POCT Blood Glucose.: 140 mg/dL (2020 16:58)  POCT Blood Glucose.: 194 mg/dL (2020 16:56)  POCT Blood Glucose.: 66 mg/dL (2020 16:08)      CAPILLARY BLOOD GLUCOSE      POCT Blood Glucose.: 116 mg/dL (2020 06:13)  POCT Blood Glucose.: 118 mg/dL (2020 21:21)  POCT Blood Glucose.: 152 mg/dL (2020 19:20)  POCT Blood Glucose.: 140 mg/dL (2020 16:58)  POCT Blood Glucose.: 194 mg/dL (2020 16:56)  POCT Blood Glucose.: 66 mg/dL (2020 16:08)    CAPILLARY BLOOD GLUCOSE      POCT Blood Glucose.: 116 mg/dL (2020 06:13)            RECENT CULTURES:      RADIOLOGY & ADDITIONAL TESTS:                        DVT/GI ppx  Discussed with pt @ bedside

## 2020-04-28 NOTE — CHART NOTE - NSCHARTNOTEFT_GEN_A_CORE
Nutrition Initial Assessment    Nutrition Consult Received: Yes [   ]  No [ x  ]    Reason for Initial Nutrition Assessment:pressure injury    Source of Information: Unable to conduct a fact to face interview due to limited contact restrictions related to pt's medical condition and isolation precautions. Information obtained from a phone call with [facility] and from the EMR.    Admitting Diagnosis: 89y Female admitted for Closed fracture of right hip, initial encounter    PAST MEDICAL & SURGICAL HISTORY:  Constipation  Pulmonary hypertension  Pulmonary fibrosis  DM (diabetes mellitus)  No significant past surgical history      Subjective Information: Pt admit with hip fx, NPO for OR. Hx DM, constipation, pulm hypertension, pulm fibrosis. Per EMR, st 2 sacral pressure injury, on vit c/zinc daily. Hba1c pending, but pt with episode of hypoglycemia x1 yesterday. Pt on venti mask at present. COVID- at admission. Pt speaks Japanese,  phone not feasible today. Spoke with RN at [facility]  pt 5', 84#, was on mechanical soft NCS, lactose free diet with glucerna supplements BID.        GI Issues:  Hx constipation- currently on dulcolax suppository. May need additional bowel regimen after OR with limited mobility and pain meds slowing gastric motility.    Current Nutrition Order:  PO Intake:   Good (%) [   ]    Fair (50-75%) [   ]    Poor (<50%) [   ]    Skin Integrity:see above    Labs:   04-28 Na140 mmol/L Glu 119 mg/dL<H> K+ 3.9 mmol/L Cr  0.61 mg/dL BUN 17 mg/dL Phos n/a   Alb 2.5 g/dL<L> PAB n/a           POCT Blood Glucose.: 116 mg/dL (04-28-20 @ 06:13)  POCT Blood Glucose.: 118 mg/dL (04-27-20 @ 21:21)  POCT Blood Glucose.: 152 mg/dL (04-27-20 @ 19:20)  POCT Blood Glucose.: 140 mg/dL (04-27-20 @ 16:58)  POCT Blood Glucose.: 194 mg/dL (04-27-20 @ 16:56)  POCT Blood Glucose.: 66 mg/dL (04-27-20 @ 16:08)    Medications:  MEDICATIONS  (STANDING):  ALBUTerol    90 MICROgram(s) HFA Inhaler 2 Puff(s) Inhalation every 6 hours  ascorbic acid 500 milliGRAM(s) Oral daily  cyanocobalamin 1000 MICROGram(s) Oral daily  dextrose 5%. 1000 milliLiter(s) (50 mL/Hr) IV Continuous <Continuous>  dextrose 50% Injectable 12.5 Gram(s) IV Push once  dextrose 50% Injectable 25 Gram(s) IV Push once  dextrose 50% Injectable 25 Gram(s) IV Push once  gabapentin 100 milliGRAM(s) Oral daily  insulin lispro (HumaLOG) corrective regimen sliding scale   SubCutaneous three times a day before meals  lactated ringers. 1000 milliLiter(s) (50 mL/Hr) IV Continuous <Continuous>  latanoprost 0.005% Ophthalmic Solution 1 Drop(s) Both EYES at bedtime  tiotropium 18 MICROgram(s) Capsule 1 Capsule(s) Inhalation daily  zinc sulfate 220 milliGRAM(s) Oral daily    MEDICATIONS  (PRN):  acetaminophen   Tablet .. 650 milliGRAM(s) Oral every 6 hours PRN Mild Pain (1 - 3)  bisacodyl Suppository 10 milliGRAM(s) Rectal daily PRN Constipation  dextrose 40% Gel 15 Gram(s) Oral once PRN Blood Glucose LESS THAN 70 milliGRAM(s)/deciliter  glucagon  Injectable 1 milliGRAM(s) IntraMuscular once PRN Glucose LESS THAN 70 milligrams/deciliter  morphine  - Injectable 2 milliGRAM(s) IV Push every 6 hours PRN Severe Pain (7 - 10)  traMADol 25 milliGRAM(s) Oral every 6 hours PRN Moderate Pain (4 - 6)    Admitted Anthropometrics:    5'84#, BMI 16    Nutrition Focused Physical Exam: Unable to complete due to limited isolation contact precautions at this time.     Estimated Energy Needs (_30_ kcal/kg- __35_ kcal/kg): 1150-1330cals  Estimated Protein Needs (1.2__ g/kg- _1.6__ g/kg): 45-60 g pro  Based on weight of:84#    [x  ] Nutrition Diagnosis:increased energy needs related to hip fx/OR  [  ] No active nutrition diagnosis at this time  [  ] Current medical condition precludes nutrition intervention    Goal:Pt will meet estimated needs within 48 hrs of OR    Nutrition Interventions:     Recommendations:Rec MVI daily  Monitor bowel function.  When diet avanced after OR, recommend Mechanical soft, glucerna BID, lactose free. Will follow glu for need for consistent CHO  Diet office made aware of need for lactose free/ mechanical soft diet.        RD to follow-up per protocol.

## 2020-04-28 NOTE — PROGRESS NOTE ADULT - SUBJECTIVE AND OBJECTIVE BOX
Patient seen and examined at bedside. Reports no acute complaints at this time. Pain is well controlled. Desaturated to 82% on RA; placed on Venti mask. CTA chest pending this AM.    PHYSICAL EXAM:  Vital Signs Last 24 Hrs  T(C): 36.4 (28 Apr 2020 05:05), Max: 37.2 (27 Apr 2020 20:22)  T(F): 97.5 (28 Apr 2020 05:05), Max: 98.9 (27 Apr 2020 20:22)  HR: 81 (28 Apr 2020 05:05) (77 - 97)  BP: 158/84 (28 Apr 2020 05:05) (155/87 - 171/77)  BP(mean): --  RR: 22 (28 Apr 2020 05:05) (15 - 40)  SpO2: 100% (28 Apr 2020 05:05) (82% - 100%)    Gen: NAD, AAOx3    Right Lower Extremity:  Leg shortened and externally rotated  +EHL/FHL/TA/GS  SILT L3-S1  +DP/PT Pulses  Compartments soft  No calf TTP B/L

## 2020-04-28 NOTE — PROGRESS NOTE ADULT - ASSESSMENT
A/P: 89F w/ R IT Fx  Analgesia  Hold all chemical DVT ppx for OR  NPO  IVF while NPO  NWB RLE  PT/OT  Encourage incentive spirometry  Medical optimization for OR  Plan for OR this AM for IMN  Will discuss with attending and advise if plan changes

## 2020-04-28 NOTE — PROGRESS NOTE ADULT - SUBJECTIVE AND OBJECTIVE BOX
[facility] -- Kenneth Roque, [place], [place], Camden Winter Savella  Office # [phone]      Follow Up:    Cardiac clearance, AS  Subjective/Observations:   No events overnight resting comfortably in bed.  No complaints of chest pain, dyspnea, or palpitations reported. No signs of orthopnea or PND. Wearing ventimask     REVIEW OF SYSTEMS: All other review of systems is negative unless indicated above    PAST MEDICAL & SURGICAL HISTORY:  Constipation  Pulmonary hypertension  Pulmonary fibrosis  DM (diabetes mellitus)  No significant past surgical history      MEDICATIONS  (STANDING):  ALBUTerol    90 MICROgram(s) HFA Inhaler 2 Puff(s) Inhalation every 6 hours  ascorbic acid 500 milliGRAM(s) Oral daily  cyanocobalamin 1000 MICROGram(s) Oral daily  dextrose 5%. 1000 milliLiter(s) (50 mL/Hr) IV Continuous <Continuous>  dextrose 50% Injectable 12.5 Gram(s) IV Push once  dextrose 50% Injectable 25 Gram(s) IV Push once  dextrose 50% Injectable 25 Gram(s) IV Push once  gabapentin 100 milliGRAM(s) Oral daily  insulin lispro (HumaLOG) corrective regimen sliding scale   SubCutaneous three times a day before meals  lactated ringers. 1000 milliLiter(s) (50 mL/Hr) IV Continuous <Continuous>  latanoprost 0.005% Ophthalmic Solution 1 Drop(s) Both EYES at bedtime  tiotropium 18 MICROgram(s) Capsule 1 Capsule(s) Inhalation daily  zinc sulfate 220 milliGRAM(s) Oral daily    MEDICATIONS  (PRN):  acetaminophen   Tablet .. 650 milliGRAM(s) Oral every 6 hours PRN Mild Pain (1 - 3)  bisacodyl Suppository 10 milliGRAM(s) Rectal daily PRN Constipation  dextrose 40% Gel 15 Gram(s) Oral once PRN Blood Glucose LESS THAN 70 milliGRAM(s)/deciliter  glucagon  Injectable 1 milliGRAM(s) IntraMuscular once PRN Glucose LESS THAN 70 milligrams/deciliter  morphine  - Injectable 2 milliGRAM(s) IV Push every 6 hours PRN Severe Pain (7 - 10)  traMADol 25 milliGRAM(s) Oral every 6 hours PRN Moderate Pain (4 - 6)      Allergies    No Known Allergies    Intolerances        Vital Signs Last 24 Hrs  T(C): 36.4 (28 Apr 2020 05:05), Max: 37.2 (27 Apr 2020 20:22)  T(F): 97.5 (28 Apr 2020 05:05), Max: 98.9 (27 Apr 2020 20:22)  HR: 81 (28 Apr 2020 05:05) (77 - 97)  BP: 158/84 (28 Apr 2020 05:05) (155/87 - 171/77)  BP(mean): --  RR: 22 (28 Apr 2020 05:05) (15 - 40)  SpO2: 100% (28 Apr 2020 05:05) (82% - 100%)    I&O's Summary    27 Apr 2020 07:01  -  28 Apr 2020 07:00  --------------------------------------------------------  IN: 0 mL / OUT: 200 mL / NET: -200 mL          PHYSICAL EXAM:  TELE: Off tele   Constitutional: NAD, awake and alert, well-developed  HEENT: Moist Mucous Membranes, Anicteric  Pulmonary: Non-labored, breath sounds are clear bilaterally, No wheezing, crackles or rhonchi  Cardiovascular: Regular, S1 and S2 nl, + murmur No rubs, gallops or clicks   Gastrointestinal: Bowel Sounds present, soft, nontender.   Lymph: No lymphadenopathy. No peripheral edema.  Skin: No visible rashes or ulcers.  Psych:  Mood & affect appropriate    LABS: All Labs Reviewed:                        10.8   7.11  )-----------( 173      ( 28 Apr 2020 04:58 )             33.6                         10.3   7.80  )-----------( 167      ( 27 Apr 2020 12:00 )             32.1     28 Apr 2020 04:58    144    |  104    |  18     ----------------------------<  118    4.1     |  34     |  0.60   27 Apr 2020 12:00    143    |  107    |  19     ----------------------------<  72     3.7     |  34     |  0.64     Ca    8.7        28 Apr 2020 04:58  Ca    9.0        27 Apr 2020 12:00    TPro  6.4    /  Alb  2.6    /  TBili  0.6    /  DBili  x      /  AST  19     /  ALT  21     /  AlkPhos  74     27 Apr 2020 12:00    PT/INR - ( 28 Apr 2020 04:58 )   PT: 16.3 sec;   INR: 1.44 ratio         PTT - ( 28 Apr 2020 04:58 )  PTT:28.1 sec         ECG:  < from: 12 Lead ECG (04.27.20 @ 12:21) >  Ventricular Rate 75 BPM    Atrial Rate 75 BPM    P-R Interval 152 ms    QRS Duration 88 ms    Q-T Interval 358 ms    QTC Calculation(Bezet) 399 ms    P Axis 43 degrees    R Axis -34 degrees    T Axis 7 degrees    Diagnosis Line Normal sinus rhythm  Left axis deviation  Moderate voltage criteria for LVH, may be normal variant  Abnormal ECG  No previous ECGs available  Confirmed by Sonido Ta MD ([id number]) on 4/27/2020 2:21:22 PM    < end of copied text >    Echo:  pending   Radiology:  < from: Xray Chest 1 View-PORTABLE IMMEDIATE (04.27.20 @ 12:57) >  EXAM:  XR CHEST PORTABLE IMMED 1V                            PROCEDURE DATE:  04/27/2020          INTERPRETATION:    Examination: XR CHEST IMMEDIATE    History: , pre op  No prior  Findings:  Heart magnified by projection. Atherosclerotic aorta. There is severe diffuse bilateral nearly symmetric interstitial fibrosis. No definite focal infiltrate. No bryan parenchymal consolidation or pleural effusion.    Impression:  1.  Severe bilateral interstitial fibrosis without gross focal infiltrate.      DISCRETE X-RAY DATA:  Percent of LEFT lung opacification: %  Percent of RIGHT lung opacification: %  Change in lung opacification from most recent x-ray (<=3 days): No Prior  Change from prior dated 3 or more days (same admission): No Prior                  SOO AQUINO M.D., ATTENDING RADIOLOGIST  This document has been electronically signed. Apr 27 2020 12:59PM                < end of copied text >

## 2020-04-28 NOTE — CHART NOTE - NSCHARTNOTEFT_GEN_A_CORE
Discussed with cardiologist, Dr. Ta. Preliminary findings of TTE (4/28) include: Normal LV, Mild AS, Mod TR, Pulm HTN. Formal report to follow.

## 2020-04-28 NOTE — PROGRESS NOTE ADULT - PROBLEM SELECTOR PLAN 1
- Patient s/p fall on right hip at nursing home  - Right femur XR:  Right femur is intact.  - Right hip XR: Displaced intertrochanteric fracture right hip  - COVID 19 negative  pt going for OR< given pt pulmonary fibrosis, ct angio negative for PE  pt is high risk for OR, pulm clearence needed

## 2020-04-28 NOTE — GOALS OF CARE CONVERSATION - ADVANCED CARE PLANNING - CONVERSATION DETAILS
spoke with Celeste Jurado, she consented to DNR DNI .States she understand it needs to be recinded for OR but will be inststuted post op.Dr Brown notified

## 2020-04-28 NOTE — PROGRESS NOTE ADULT - SUBJECTIVE AND OBJECTIVE BOX
notes reviewed, spoke at length in conjunction with anesthesia with pt family Pt with severe pulmonary hypertension. also aortic stenosis. diff maintaining appropriate sats with supplemental O2. very high risk for surgery, very possible/likely pt would remain intubated post-op. risk factors cannot be modified/mitigated. family elects to non-operative rx risk factors exceed potential benefits at this point. rx will focus on pain control, analgesics PRN, may be oob if tolerated from pain threshold stanpoint. NWB RLE.

## 2020-04-28 NOTE — PROGRESS NOTE ADULT - PROBLEM SELECTOR PLAN 1
frail and weak - ataxic gait - cognitive impairment  pulm fibrosis and chronic lung disease  on 50 pct fio2 - high risk for prolonged risk post op  will check CTA Chest this am  discussed prognosis and risks with the daughter  pt is full code  HOB elev  I lisa if able to cooperate   on Albuterol and Spiriva - Inhalers  VBG noted - reflective of Chronic Lung disease -   optimized to the fullest extent of Pulmonary medicine - remains a high risk for prolonged mechanical ventilation post operatively - this was discussed with Daughter who is the power of   pt is full code

## 2020-04-28 NOTE — PROGRESS NOTE ADULT - SUBJECTIVE AND OBJECTIVE BOX
Date/Time Patient Seen:  		  Referring MD:   Data Reviewed	       Patient is a 89y old  Female who presents with a chief complaint of Right hip injury (27 Apr 2020 17:09)      Subjective/HPI     PAST MEDICAL & SURGICAL HISTORY:  Constipation  Pulmonary hypertension  Pulmonary fibrosis  DM (diabetes mellitus)  No significant past surgical history        Medication list         MEDICATIONS  (STANDING):  ALBUTerol    90 MICROgram(s) HFA Inhaler 2 Puff(s) Inhalation every 6 hours  ascorbic acid 500 milliGRAM(s) Oral daily  cyanocobalamin 1000 MICROGram(s) Oral daily  dextrose 5%. 1000 milliLiter(s) (50 mL/Hr) IV Continuous <Continuous>  dextrose 50% Injectable 12.5 Gram(s) IV Push once  dextrose 50% Injectable 25 Gram(s) IV Push once  dextrose 50% Injectable 25 Gram(s) IV Push once  gabapentin 100 milliGRAM(s) Oral daily  insulin lispro (HumaLOG) corrective regimen sliding scale   SubCutaneous three times a day before meals  lactated ringers. 1000 milliLiter(s) (50 mL/Hr) IV Continuous <Continuous>  latanoprost 0.005% Ophthalmic Solution 1 Drop(s) Both EYES at bedtime  tiotropium 18 MICROgram(s) Capsule 1 Capsule(s) Inhalation daily  zinc sulfate 220 milliGRAM(s) Oral daily    MEDICATIONS  (PRN):  acetaminophen   Tablet .. 650 milliGRAM(s) Oral every 6 hours PRN Mild Pain (1 - 3)  bisacodyl Suppository 10 milliGRAM(s) Rectal daily PRN Constipation  dextrose 40% Gel 15 Gram(s) Oral once PRN Blood Glucose LESS THAN 70 milliGRAM(s)/deciliter  glucagon  Injectable 1 milliGRAM(s) IntraMuscular once PRN Glucose LESS THAN 70 milligrams/deciliter  morphine  - Injectable 2 milliGRAM(s) IV Push every 6 hours PRN Severe Pain (7 - 10)  traMADol 25 milliGRAM(s) Oral every 6 hours PRN Moderate Pain (4 - 6)         Vitals log        ICU Vital Signs Last 24 Hrs  T(C): 36.4 (28 Apr 2020 05:05), Max: 37.2 (27 Apr 2020 20:22)  T(F): 97.5 (28 Apr 2020 05:05), Max: 98.9 (27 Apr 2020 20:22)  HR: 81 (28 Apr 2020 05:05) (77 - 97)  BP: 158/84 (28 Apr 2020 05:05) (155/87 - 171/77)  BP(mean): --  ABP: --  ABP(mean): --  RR: 22 (28 Apr 2020 05:05) (15 - 40)  SpO2: 100% (28 Apr 2020 05:05) (82% - 100%)           Input and Output:  I&O's Detail    27 Apr 2020 07:01  -  28 Apr 2020 06:35  --------------------------------------------------------  IN:  Total IN: 0 mL    OUT:    Voided: 200 mL  Total OUT: 200 mL    Total NET: -200 mL          Lab Data                        10.8   7.11  )-----------( 173      ( 28 Apr 2020 04:58 )             33.6     04-28    144  |  104  |  18  ----------------------------<  118<H>  4.1   |  34<H>  |  0.60    Ca    8.7      28 Apr 2020 04:58    TPro  6.4  /  Alb  2.6<L>  /  TBili  0.6  /  DBili  x   /  AST  19  /  ALT  21  /  AlkPhos  74  04-27            Review of Systems	      Objective     Physical Examination    heart s1s2  lung dec BS  on V mask fio2 support  awake  verbal  confused      Pertinent Lab findings & Imaging      Heri:  NO   Adequate UO     I&O's Detail    27 Apr 2020 07:01  -  28 Apr 2020 06:35  --------------------------------------------------------  IN:  Total IN: 0 mL    OUT:    Voided: 200 mL  Total OUT: 200 mL    Total NET: -200 mL               Discussed with:     Cultures:	        Radiology

## 2020-04-29 PROCEDURE — 99232 SBSQ HOSP IP/OBS MODERATE 35: CPT

## 2020-04-29 RX ADMIN — ALBUTEROL 2 PUFF(S): 90 AEROSOL, METERED ORAL at 17:30

## 2020-04-29 RX ADMIN — ALBUTEROL 2 PUFF(S): 90 AEROSOL, METERED ORAL at 11:28

## 2020-04-29 RX ADMIN — Medication 500 MILLIGRAM(S): at 11:23

## 2020-04-29 RX ADMIN — PREGABALIN 1000 MICROGRAM(S): 225 CAPSULE ORAL at 11:23

## 2020-04-29 RX ADMIN — TRAMADOL HYDROCHLORIDE 25 MILLIGRAM(S): 50 TABLET ORAL at 09:27

## 2020-04-29 RX ADMIN — GABAPENTIN 100 MILLIGRAM(S): 400 CAPSULE ORAL at 11:23

## 2020-04-29 RX ADMIN — ALBUTEROL 2 PUFF(S): 90 AEROSOL, METERED ORAL at 05:48

## 2020-04-29 RX ADMIN — LATANOPROST 1 DROP(S): 0.05 SOLUTION/ DROPS OPHTHALMIC; TOPICAL at 21:15

## 2020-04-29 RX ADMIN — ENOXAPARIN SODIUM 40 MILLIGRAM(S): 100 INJECTION SUBCUTANEOUS at 11:23

## 2020-04-29 RX ADMIN — ZINC SULFATE TAB 220 MG (50 MG ZINC EQUIVALENT) 220 MILLIGRAM(S): 220 (50 ZN) TAB at 11:23

## 2020-04-29 RX ADMIN — TIOTROPIUM BROMIDE 1 CAPSULE(S): 18 CAPSULE ORAL; RESPIRATORY (INHALATION) at 05:48

## 2020-04-29 NOTE — PROGRESS NOTE ADULT - SUBJECTIVE AND OBJECTIVE BOX
Patient is a 89y old  Female who presents with a chief complaint of Right hip injury (2020 07:18)      FROM ADMISSION H+P:   HPI:  89 year old female with PMHx of pulmonary fibrosis, pulmonary HTN, T2DM, Aortic Stenosis and mitral regurgitation, and constipation, BIBEMS to ED with right hip pain in setting of recent unwitnessed fall. Patient is a poor historian with little memory of the accident at the nursing home at [facility] in [place], [place]. Patient states the last thing she remembered was that she was using her walker to get to her room and found herself on the floor after. Patient states she thinks she hit her head but isn't sure. She states she found herself on the floor with severe pain in her right hip. Patient states this has never happened to her before. Of note patient is at [facility] in [place]. She denies headache, chest pain, fever and chills. Of note, patient was last hospitalized for Pneumonia in 2019    ED Course: Vitals: Temp 97.4F oral, HR 77, /87, RR 15, O2 sat 98 on RA.  Labs: H/h 10.3/32.1, RDW 15.2, PT/INR 15/1.33, bicarb 34, anion gap 2, albumin 2.6  CXR: severe b/l intersitial fibrosis w/o focal infiltrate  Right femur XR:  Right femur is intact.  Right hip XR: Displaced intertrochanteric fracture right hip (2020 13:13)      ----  INTERVAL HPI/OVERNIGHT EVENTS: No acute overnight events noted.     ----  PAST MEDICAL & SURGICAL HISTORY:  Constipation  Pulmonary hypertension  Pulmonary fibrosis  DM (diabetes mellitus)  No significant past surgical history      FAMILY HISTORY:      Allergies    No Known Allergies    Intolerances    lactose (Unknown)      ----  REVIEW OF SYSTEMS:      ----  PHYSICAL EXAM:  GENERAL: confused, on VM  HEAD:  Atraumatic, Normocephalic  NERVOUS SYSTEM:  Alert   CHEST/LUNG: decrease air entr yast the abses   HEART: Regular rate and rhythm; No murmurs, rubs, or gallops  ABDOMEN: Soft, Nontender, Nondistended; Bowel sounds present  EXTREMITIES:  right hup fracture    T(C): 36.3 (20 @ 05:46), Max: 36.8 (20 @ 13:45)  HR: 84 (20 @ 05:46) (84 - 90)  BP: 152/70 (20 @ 05:46) (149/69 - 160/85)  RR: 18 (20 @ 05:46) (18 - 19)  SpO2: 98% (20 @ 05:46) (78% - 100%)  Wt(kg): --    ----  I&O's Summary    2020 07:01  -  2020 07:00  --------------------------------------------------------  IN: 450 mL / OUT: 400 mL / NET: 50 mL        LABS:                        10.9   8.59  )-----------( 175      ( 2020 09:51 )             33.6         140  |  103  |  17  ----------------------------<  119<H>  3.9   |  29  |  0.61    Ca    8.7      2020 09:51    TPro  6.7  /  Alb  2.5<L>  /  TBili  1.0  /  DBili  x   /  AST  19  /  ALT  19  /  AlkPhos  76  -28    PT/INR - ( 2020 09:51 )   PT: 16.7 sec;   INR: 1.47 ratio         PTT - ( 2020 09:51 )  PTT:28.0 sec  Urinalysis Basic - ( 2020 08:00 )    Color: Yellow / Appearance: Clear / S.020 / pH: x  Gluc: x / Ketone: Negative  / Bili: Negative / Urobili: Negative   Blood: x / Protein: 100 / Nitrite: Negative   Leuk Esterase: Small / RBC: Negative /HPF / WBC 11-25   Sq Epi: x / Non Sq Epi: Occasional / Bacteria: Few      CAPILLARY BLOOD GLUCOSE      POCT Blood Glucose.: 113 mg/dL (2020 07:18)  POCT Blood Glucose.: 117 mg/dL (2020 21:05)  POCT Blood Glucose.: 151 mg/dL (2020 16:38)  POCT Blood Glucose.: 123 mg/dL (2020 11:14) Patient is a 89y old  Female who presents with a chief complaint of Right hip injury (2020 07:18)      FROM ADMISSION H+P:   HPI:  89 year old female with PMHx of pulmonary fibrosis, pulmonary HTN, T2DM, Aortic Stenosis and mitral regurgitation, and constipation, BIBEMS to ED with right hip pain in setting of recent unwitnessed fall. Patient is a poor historian with little memory of the accident at the nursing home at [facility] in [place], [place]. Patient states the last thing she remembered was that she was using her walker to get to her room and found herself on the floor after. Patient states she thinks she hit her head but isn't sure. She states she found herself on the floor with severe pain in her right hip. Patient states this has never happened to her before. Of note patient is at [facility] in [place]. She denies headache, chest pain, fever and chills. Of note, patient was last hospitalized for Pneumonia in 2019    ED Course: Vitals: Temp 97.4F oral, HR 77, /87, RR 15, O2 sat 98 on RA.  Labs: H/h 10.3/32.1, RDW 15.2, PT/INR 15/1.33, bicarb 34, anion gap 2, albumin 2.6  CXR: severe b/l intersitial fibrosis w/o focal infiltrate  Right femur XR:  Right femur is intact.  Right hip XR: Displaced intertrochanteric fracture right hip (2020 13:13)      ----  INTERVAL HPI/OVERNIGHT EVENTS: No acute overnight events noted. Patient complaining of pain in R groin region, denies any CP, SOB, abd pain.    ----  PAST MEDICAL & SURGICAL HISTORY:  Constipation  Pulmonary hypertension  Pulmonary fibrosis  DM (diabetes mellitus)  No significant past surgical history        Allergies    No Known Allergies    Intolerances    lactose (Unknown)      ----  REVIEW OF SYSTEMS: as per HPI      ----  PHYSICAL EXAM:  GENERAL: confused, on VM  HEAD:  Atraumatic, Normocephalic  NERVOUS SYSTEM:  AAOx2, nonfocal  CHEST/LUNG: decrease air entry at the bases   HEART: Regular rate and rhythm; No murmurs, rubs, or gallops  ABDOMEN: Soft, Nontender, Nondistended; Bowel sounds present  EXTREMITIES:  right hip fracture- RLE shortened-externally rotated    T(C): 36.3 (20 @ 05:46), Max: 36.8 (20 @ 13:45)  HR: 84 (20 @ 05:46) (84 - 90)  BP: 152/70 (20 @ 05:46) (149/69 - 160/85)  RR: 18 (20 @ 05:46) (18 - 19)  SpO2: 98% (20 @ 05:46) (78% - 100%)  Wt(kg): --    ----  I&O's Summary    2020 07:01  -  2020 07:00  --------------------------------------------------------  IN: 450 mL / OUT: 400 mL / NET: 50 mL        LABS:                        10.9   8.59  )-----------( 175      ( 2020 09:51 )             33.6     04-28    140  |  103  |  17  ----------------------------<  119<H>  3.9   |  29  |  0.61    Ca    8.7      2020 09:51    TPro  6.7  /  Alb  2.5<L>  /  TBili  1.0  /  DBili  x   /  AST  19  /  ALT  19  /  AlkPhos  76  04-28    PT/INR - ( 2020 09:51 )   PT: 16.7 sec;   INR: 1.47 ratio         PTT - ( 2020 09:51 )  PTT:28.0 sec  Urinalysis Basic - ( 2020 08:00 )    Color: Yellow / Appearance: Clear / S.020 / pH: x  Gluc: x / Ketone: Negative  / Bili: Negative / Urobili: Negative   Blood: x / Protein: 100 / Nitrite: Negative   Leuk Esterase: Small / RBC: Negative /HPF / WBC 11-25   Sq Epi: x / Non Sq Epi: Occasional / Bacteria: Few      CAPILLARY BLOOD GLUCOSE      POCT Blood Glucose.: 113 mg/dL (2020 07:18)  POCT Blood Glucose.: 117 mg/dL (2020 21:05)  POCT Blood Glucose.: 151 mg/dL (2020 16:38)  POCT Blood Glucose.: 123 mg/dL (2020 11:14)

## 2020-04-29 NOTE — PROGRESS NOTE ADULT - PROBLEM SELECTOR PLAN 5
IMPROVE VTE Individual Risk Assessment  RISK                                                                Points  [  ] Previous VTE                                                  3  [  ] Thrombophilia                                               2  [ x ] Lower limb paralysis                                      2        (unable to hold up >15 seconds)    [  ] Current Cancer                                              2         (within 6 months)  [  ] Immobilization > 24 hrs                                1  [  ] ICU/CCU stay > 24 hours                              1  [ x ] Age > 60                                                      1  IMPROVE VTE Score _____3____  IMPROVE Score 0-1: Low Risk, No VTE prophylaxis required for most patients, encourage ambulation.   IMPROVE Score 2-3: At risk, pharmacologic VTE prophylaxis is indicated for most patients (in the absence of a contraindication)  IMPROVE Score > or = 4: High Risk, pharmacologic VTE prophylaxis is indicated for most patients (in the absence of a contraindication)  DVT ppx: Lovenox 40mg subq daily

## 2020-04-29 NOTE — PROGRESS NOTE ADULT - PROBLEM SELECTOR PLAN 1
- Patient s/p fall on right hip at nursing home  - Right femur XR:  Right femur is intact.  - Right hip XR: Displaced intertrochanteric fracture right hip  - COVID 19 negative  - family opting for non-operative treatment as patient very high risk for surgery with severe pulmonary hypertension. also aortic stenosis. diff maintaining appropriate sats with supplemental O2 per ortho - Patient s/p fall on right hip at nursing home  - Right femur XR:  Right femur is intact.  - Right hip XR: Displaced intertrochanteric fracture right hip  - COVID 19 negative  - family opting for non-operative treatment as patient very high risk for surgery with severe pulmonary hypertension. also aortic stenosis. diff maintaining appropriate sats with supplemental O2  -Ortho following

## 2020-04-29 NOTE — PROGRESS NOTE ADULT - SUBJECTIVE AND OBJECTIVE BOX
[facility] -- Kenneth Roque Grossman, [place], Camden Winter Sav[place], [place]: Office # [phone]    Follow Up:  Cardiac clearance, AS    Subjective/Observations: Patient seen and examined. Patient awake and alert, resting comfortably in bed. Tolerating nasal O2 via nasal canula. No complaints of chest pain, SOB, LE edema, cough. No signs of orthopnea or PND.    REVIEW OF SYSTEMS: All review of systems is negative for eye, ENT, GI, , allergic, dermatologic, musculoskeletal and neurologic except as described above    PAST MEDICAL & SURGICAL HISTORY:  Constipation  Pulmonary hypertension  Pulmonary fibrosis  DM (diabetes mellitus)  No significant past surgical history    MEDICATIONS  (STANDING):  ALBUTerol    90 MICROgram(s) HFA Inhaler 2 Puff(s) Inhalation every 6 hours  ascorbic acid 500 milliGRAM(s) Oral daily  cyanocobalamin 1000 MICROGram(s) Oral daily  dextrose 5%. 1000 milliLiter(s) (50 mL/Hr) IV Continuous <Continuous>  dextrose 50% Injectable 12.5 Gram(s) IV Push once  dextrose 50% Injectable 25 Gram(s) IV Push once  dextrose 50% Injectable 25 Gram(s) IV Push once  enoxaparin Injectable 40 milliGRAM(s) SubCutaneous daily  gabapentin 100 milliGRAM(s) Oral daily  insulin lispro (HumaLOG) corrective regimen sliding scale   SubCutaneous three times a day before meals  latanoprost 0.005% Ophthalmic Solution 1 Drop(s) Both EYES at bedtime  tiotropium 18 MICROgram(s) Capsule 1 Capsule(s) Inhalation daily  zinc sulfate 220 milliGRAM(s) Oral daily    MEDICATIONS  (PRN):  acetaminophen   Tablet .. 650 milliGRAM(s) Oral every 6 hours PRN Mild Pain (1 - 3)  bisacodyl Suppository 10 milliGRAM(s) Rectal daily PRN Constipation  dextrose 40% Gel 15 Gram(s) Oral once PRN Blood Glucose LESS THAN 70 milliGRAM(s)/deciliter  glucagon  Injectable 1 milliGRAM(s) IntraMuscular once PRN Glucose LESS THAN 70 milligrams/deciliter  morphine  - Injectable 2 milliGRAM(s) IV Push every 6 hours PRN Severe Pain (7 - 10)  traMADol 25 milliGRAM(s) Oral every 6 hours PRN Moderate Pain (4 - 6)    Allergies  No Known Allergies    Intolerances  lactose (Unknown)    Vital Signs Last 24 Hrs  T(C): 36.3 (29 Apr 2020 05:46), Max: 36.8 (28 Apr 2020 13:45)  T(F): 97.4 (29 Apr 2020 05:46), Max: 98.2 (28 Apr 2020 13:45)  HR: 84 (29 Apr 2020 05:46) (84 - 90)  BP: 152/70 (29 Apr 2020 05:46) (149/69 - 160/85)  BP(mean): --  RR: 18 (29 Apr 2020 05:46) (18 - 19)  SpO2: 98% (29 Apr 2020 05:46) (98% - 100%)    I&O's Summary  28 Apr 2020 07:01  -  29 Apr 2020 07:00  --------------------------------------------------------  IN: 450 mL / OUT: 400 mL / NET: 50 mL    TELE: Not on telemetry   PHYSICAL EXAM:  Appearance: NAD, no distress, alert, Frail   HEENT: Moist Mucous Membranes, Anicteric  Cardiovascular: Regular rate and rhythm, Normal S1 S2, No JVD, No murmurs, No rubs, gallops or clicks  Respiratory: Non-labored, Clear to auscultation, No rales, No rhonchi, No wheezing.   Gastrointestinal:  Soft, Non-tender, + BS  Neurologic: Non-focal  Skin: Warm and dry, No visible rashes or ulcers, No ecchymosis, No cyanosis  Musculoskeletal: Rt hip fracture No clubbing, No cyanosis, No joint swelling/tenderness  Psychiatry: Mood & affect appropriate  Lymph: No peripheral edema.     LABS: All Labs Reviewed:                        10.9   8.59  )-----------( 175      ( 28 Apr 2020 09:51 )             33.6                         10.8   7.11  )-----------( 173      ( 28 Apr 2020 04:58 )             33.6                         10.3   7.80  )-----------( 167      ( 27 Apr 2020 12:00 )             32.1     28 Apr 2020 09:51    140    |  103    |  17     ----------------------------<  119    3.9     |  29     |  0.61   28 Apr 2020 04:58    144    |  104    |  18     ----------------------------<  118    4.1     |  34     |  0.60   27 Apr 2020 12:00    143    |  107    |  19     ----------------------------<  72     3.7     |  34     |  0.64     Ca    8.7        28 Apr 2020 09:51  Ca    8.7        28 Apr 2020 04:58  Ca    9.0        27 Apr 2020 12:00    TPro  6.7    /  Alb  2.5    /  TBili  1.0    /  DBili  x      /  AST  19     /  ALT  19     /  AlkPhos  76     28 Apr 2020 09:51  TPro  6.4    /  Alb  2.6    /  TBili  0.6    /  DBili  x      /  AST  19     /  ALT  21     /  AlkPhos  74     27 Apr 2020 12:00    PT/INR - ( 28 Apr 2020 09:51 )   PT: 16.7 sec;   INR: 1.47 ratio    PTT - ( 28 Apr 2020 09:51 )  PTT:28.0 sec    12 Lead ECG:   Ventricular Rate 75 BPM  Atrial Rate 75 BPM  P-R Interval 152 ms  QRS Duration 88 ms  Q-T Interval 358 ms  QTC Calculation(Bezet) 399 ms  P Axis 43 degrees  R Axis -34 degrees  T Axis 7 degrees  Diagnosis Line Normal sinus rhythm  Left axis deviation  Moderate voltage criteria for LVH, may be normal variant  Abnormal ECG  No previous ECGs available  Confirmed by Sonido Ta MD ([id number]) on 4/27/2020 2:21:22 PM (04-27-20 @ 12:21)    ECHOCARDIOGRAM  < from: TTE Echo Complete w/o contrast w/ Doppler (04.28.20 @ 08:20) >  Dimensions:    LA 2.9       Normal Values: 2.0 - 4.0 cm    Ao 3.6        Normal Values: 2.0 - 3.8 cm  SEPTUM 1.0       Normal Values: 0.6 - 1.2 cm  PWT 1.2       Normal Values: 0.6 - 1.1 cm  LVIDd 3.7         Normal Values: 3.0 - 5.6 cm  LVIDs 2.1         Normal Values: 1.8 - 4.0 cm      OBSERVATIONS:  Technically difficult study  Mitral Valve: normal, mild MR.  Aortic Valve/Aorta: Calcified trileaflet aortic valve with likely mild aortic stenosis. Mild AI  Tricuspid Valve: Severe TR.  Pulmonic Valve: Mild PI  Left Atrium: normal  Right Atrium: Not well-visualized  Left Ventricle: normal LV size and systolic function, estimated LVEF of 65%.  Right Ventricle: Grossly normal size and systolic function.  Pericardium/Pleura: normal, no significant pericardial effusion.  Pulmonary/RV Pressure: estimated PA systolic pressure of 100 mmHg consistent with severe pulmonary hypertension  LV diastolic dysfunction is present    Conclusion:   Technically difficult study  Normal left ventricular internal dimensions and systolic function, estimated LVEF of 65%.   Grossly normal RV size and systolic function.    Calcified trileaflet aortic valve with likely mild aortic stenosis, mild AI.   Trace physiologic MR   Severe TR.    Estimated PA systolic pressure of 100 mmHg consistent with severe pulmonary hypertension  No significant pericardial effusion.    < end of copied text >    < from: CT Angio Chest w/ IV Cont (04.28.20 @ 08:58) >  FINDINGS:  LUNGS AND AIRWAYS: Patent central airways.  Severe bilateral interstitial fibrosis with subpleural predominance. Nonspecific dependent groundglass opacities at the lung bases could represent inflammatory/infectious process and/or pulmonary edema. Correlate clinically for active infection. Biapical scarring. No bryan lobar consolidation.  PLEURA: Trace left pleural effusion.  MEDIASTINUM AND TRINI: No lymphadenopathy.  VESSELS: Negative for pulmonary emboli. Prominent pulmonary arteries concerning for pulmonary hypertension. Atherosclerotic nonaneurysmal thoracic aorta..  HEART: Cardiomegaly with coronary artery calcification No pericardial effusion.  CHEST WALL AND LOWER NECK: Within normal limits.  VISUALIZED UPPER ABDOMEN: 4 mm nonobstructive left renal calculus.  BONES: Senescent/degenerative changes. Age-indeterminate mild superior endplate compression deformity upper thoracic vertebral body probably chronic. A nonspecific sclerotic focus involving a midthoracic vertebral body..    IMPRESSION:   Negative for pulmonary emboli.  Severe interstitial fibrosis.  Nonspecific dependent groundglass opacity at the lung bases may reflect inflammatory/infectious process and/or pulmonary edema.  Additional findings as discussed    < end of copied text >    < from: Xray Chest 1 View-PORTABLE IMMEDIATE (04.27.20 @ 12:57) >  Findings:  Heart magnified by projection. Atherosclerotic aorta. There is severe diffuse bilateral nearly symmetric interstitial fibrosis. No definite focal infiltrate. No bryan parenchymal consolidation or pleural effusion.    Impression:  1.  Severe bilateral interstitial fibrosis without gross focal infiltrate.    DISCRETE X-RAY DATA:  Percent of LEFT lung opacification: %  Percent of RIGHT lung opacification: %  Change in lung opacification from most recent x-ray (<=3 days): No Prior  Change from prior dated 3 or more days (same admission): No Prior    < end of copied text >

## 2020-04-29 NOTE — PROGRESS NOTE ADULT - ASSESSMENT
89 y/o f PMHx of pulmonary fibrosis, pulmonary HTN, DM2, AS & MR, and constipation, BIBEMS to ED with right hip pain in setting of recent unwitnessed fall. Found to have R Hip fx. Cardiology is being consulted for clearance    Preop  She is at increased risk for her planned surgery given her comorbidities including at least moderate aortic stenosis and pulmonary fibrosis. She has no evidence for active ischemia, heart failure, or dysrhythmias. She has no modifiable risk factors and is in optimal condition for her planned surgery    Prelim TTE shows Nl LV mild AS, Mor TR, Pul HTN  Proceed with planned surgery using routine hemodynamic monitoring    TTE pending 89 y/o f PMHx of pulmonary fibrosis, pulmonary HTN, DM2, AS & MR, and constipation, BIBEMS to ED with right hip pain in setting of recent unwitnessed fall. Found to have R Hip fx. Cardiology is being consulted for clearance    Right hip fracture   - Surgery cancelled   - TTE showed EF 65%, shows Nl LV, mild AS, mild AI, trace MR, severe TR, severe Pul HTN  - Management per ortho and primary    Pulmonary HTN   - Continue nebs  - Titrate O2 as tolerated    Diabetes Mellitus  - Monitor blood sugars ac and hs  - Management per primary       - Monitor and replete lytes, keep K>4, Mg>2.  - All other medical needs as per primary team.  - Other cardiovascular workup will depend on clinical course.  - Will continue to follow.      Mikayla Martinez MS FNP, [facility]  Nurse Practitioner- Cardiology   Spectra #[phone]

## 2020-04-29 NOTE — PROGRESS NOTE ADULT - PROBLEM SELECTOR PLAN 3
- Patient with chronic hx of pulmonary hypertension 2/2 pulmonary fibrosis  - Patient does not follow a pulmonologist, currently on duonebs  - albuterol and spiriva while in the hospital

## 2020-04-29 NOTE — PROGRESS NOTE ADULT - PROBLEM SELECTOR PLAN 1
in bed, seen and examined, vs and meds reviewed  surgery cancelled - palliative approach is being sought   family understands the risks of OR  Ortho eval and follow up noted  Opioids PRN for pain management  assist with needs  monitor for issues  placement - [facility] - vs Hospice  pt is DNR DNI  valv heart disease - frailty - weakness - pulm HTN - OP - OA - ataxic gait - pulm fib.  prognosis POOR

## 2020-04-29 NOTE — PROGRESS NOTE ADULT - SUBJECTIVE AND OBJECTIVE BOX
Date/Time Patient Seen:  		  Referring MD:   Data Reviewed	       Patient is a 89y old  Female who presents with a chief complaint of Right hip injury (28 Apr 2020 14:01)      Subjective/HPI     PAST MEDICAL & SURGICAL HISTORY:  Constipation  Pulmonary hypertension  Pulmonary fibrosis  DM (diabetes mellitus)  No significant past surgical history        Medication list         MEDICATIONS  (STANDING):  ALBUTerol    90 MICROgram(s) HFA Inhaler 2 Puff(s) Inhalation every 6 hours  ascorbic acid 500 milliGRAM(s) Oral daily  cyanocobalamin 1000 MICROGram(s) Oral daily  dextrose 5%. 1000 milliLiter(s) (50 mL/Hr) IV Continuous <Continuous>  dextrose 50% Injectable 12.5 Gram(s) IV Push once  dextrose 50% Injectable 25 Gram(s) IV Push once  dextrose 50% Injectable 25 Gram(s) IV Push once  enoxaparin Injectable 40 milliGRAM(s) SubCutaneous daily  gabapentin 100 milliGRAM(s) Oral daily  insulin lispro (HumaLOG) corrective regimen sliding scale   SubCutaneous three times a day before meals  latanoprost 0.005% Ophthalmic Solution 1 Drop(s) Both EYES at bedtime  tiotropium 18 MICROgram(s) Capsule 1 Capsule(s) Inhalation daily  zinc sulfate 220 milliGRAM(s) Oral daily    MEDICATIONS  (PRN):  acetaminophen   Tablet .. 650 milliGRAM(s) Oral every 6 hours PRN Mild Pain (1 - 3)  bisacodyl Suppository 10 milliGRAM(s) Rectal daily PRN Constipation  dextrose 40% Gel 15 Gram(s) Oral once PRN Blood Glucose LESS THAN 70 milliGRAM(s)/deciliter  glucagon  Injectable 1 milliGRAM(s) IntraMuscular once PRN Glucose LESS THAN 70 milligrams/deciliter  morphine  - Injectable 2 milliGRAM(s) IV Push every 6 hours PRN Severe Pain (7 - 10)  traMADol 25 milliGRAM(s) Oral every 6 hours PRN Moderate Pain (4 - 6)         Vitals log        ICU Vital Signs Last 24 Hrs  T(C): 36.3 (29 Apr 2020 05:46), Max: 36.8 (28 Apr 2020 13:45)  T(F): 97.4 (29 Apr 2020 05:46), Max: 98.2 (28 Apr 2020 13:45)  HR: 84 (29 Apr 2020 05:46) (84 - 90)  BP: 152/70 (29 Apr 2020 05:46) (149/69 - 160/85)  BP(mean): --  ABP: --  ABP(mean): --  RR: 18 (29 Apr 2020 05:46) (18 - 19)  SpO2: 98% (29 Apr 2020 05:46) (78% - 100%)           Input and Output:  I&O's Detail    28 Apr 2020 07:01  -  29 Apr 2020 07:00  --------------------------------------------------------  IN:    lactated ringers.: 450 mL  Total IN: 450 mL    OUT:    Voided: 400 mL  Total OUT: 400 mL    Total NET: 50 mL          Lab Data                        10.9   8.59  )-----------( 175      ( 28 Apr 2020 09:51 )             33.6     04-28    140  |  103  |  17  ----------------------------<  119<H>  3.9   |  29  |  0.61    Ca    8.7      28 Apr 2020 09:51    TPro  6.7  /  Alb  2.5<L>  /  TBili  1.0  /  DBili  x   /  AST  19  /  ALT  19  /  AlkPhos  76  04-28            Review of Systems	      Objective     Physical Examination    heart s1s2  lung dec BS      Pertinent Lab findings & Imaging      Heri:  NO   Adequate UO     I&O's Detail    28 Apr 2020 07:01  -  29 Apr 2020 07:00  --------------------------------------------------------  IN:    lactated ringers.: 450 mL  Total IN: 450 mL    OUT:    Voided: 400 mL  Total OUT: 400 mL    Total NET: 50 mL               Discussed with:     Cultures:	        Radiology

## 2020-04-30 LAB
ALBUMIN SERPL ELPH-MCNC: 2.3 G/DL — LOW (ref 3.3–5)
ALP SERPL-CCNC: 64 U/L — SIGNIFICANT CHANGE UP (ref 40–120)
ALT FLD-CCNC: 15 U/L — SIGNIFICANT CHANGE UP (ref 12–78)
ANION GAP SERPL CALC-SCNC: 10 MMOL/L — SIGNIFICANT CHANGE UP (ref 5–17)
AST SERPL-CCNC: 15 U/L — SIGNIFICANT CHANGE UP (ref 15–37)
BILIRUB SERPL-MCNC: 0.9 MG/DL — SIGNIFICANT CHANGE UP (ref 0.2–1.2)
BUN SERPL-MCNC: 26 MG/DL — HIGH (ref 7–23)
CALCIUM SERPL-MCNC: 8.5 MG/DL — SIGNIFICANT CHANGE UP (ref 8.5–10.1)
CHLORIDE SERPL-SCNC: 100 MMOL/L — SIGNIFICANT CHANGE UP (ref 96–108)
CO2 SERPL-SCNC: 33 MMOL/L — HIGH (ref 22–31)
CREAT SERPL-MCNC: 0.62 MG/DL — SIGNIFICANT CHANGE UP (ref 0.5–1.3)
GLUCOSE SERPL-MCNC: 100 MG/DL — HIGH (ref 70–99)
HCT VFR BLD CALC: 29.1 % — LOW (ref 34.5–45)
HGB BLD-MCNC: 9.2 G/DL — LOW (ref 11.5–15.5)
MCHC RBC-ENTMCNC: 29.4 PG — SIGNIFICANT CHANGE UP (ref 27–34)
MCHC RBC-ENTMCNC: 31.6 GM/DL — LOW (ref 32–36)
MCV RBC AUTO: 93 FL — SIGNIFICANT CHANGE UP (ref 80–100)
NRBC # BLD: 0 /100 WBCS — SIGNIFICANT CHANGE UP (ref 0–0)
PLATELET # BLD AUTO: 176 K/UL — SIGNIFICANT CHANGE UP (ref 150–400)
POTASSIUM SERPL-MCNC: 3.8 MMOL/L — SIGNIFICANT CHANGE UP (ref 3.5–5.3)
POTASSIUM SERPL-SCNC: 3.8 MMOL/L — SIGNIFICANT CHANGE UP (ref 3.5–5.3)
PROT SERPL-MCNC: 6.1 G/DL — SIGNIFICANT CHANGE UP (ref 6–8.3)
RBC # BLD: 3.13 M/UL — LOW (ref 3.8–5.2)
RBC # FLD: 15.5 % — HIGH (ref 10.3–14.5)
SODIUM SERPL-SCNC: 143 MMOL/L — SIGNIFICANT CHANGE UP (ref 135–145)
WBC # BLD: 6.51 K/UL — SIGNIFICANT CHANGE UP (ref 3.8–10.5)
WBC # FLD AUTO: 6.51 K/UL — SIGNIFICANT CHANGE UP (ref 3.8–10.5)

## 2020-04-30 PROCEDURE — 99232 SBSQ HOSP IP/OBS MODERATE 35: CPT

## 2020-04-30 RX ADMIN — PREGABALIN 1000 MICROGRAM(S): 225 CAPSULE ORAL at 11:57

## 2020-04-30 RX ADMIN — ZINC SULFATE TAB 220 MG (50 MG ZINC EQUIVALENT) 220 MILLIGRAM(S): 220 (50 ZN) TAB at 11:57

## 2020-04-30 RX ADMIN — ALBUTEROL 2 PUFF(S): 90 AEROSOL, METERED ORAL at 01:13

## 2020-04-30 RX ADMIN — Medication 650 MILLIGRAM(S): at 01:11

## 2020-04-30 RX ADMIN — MORPHINE SULFATE 2 MILLIGRAM(S): 50 CAPSULE, EXTENDED RELEASE ORAL at 06:07

## 2020-04-30 RX ADMIN — ALBUTEROL 2 PUFF(S): 90 AEROSOL, METERED ORAL at 06:12

## 2020-04-30 RX ADMIN — TIOTROPIUM BROMIDE 1 CAPSULE(S): 18 CAPSULE ORAL; RESPIRATORY (INHALATION) at 06:12

## 2020-04-30 RX ADMIN — LATANOPROST 1 DROP(S): 0.05 SOLUTION/ DROPS OPHTHALMIC; TOPICAL at 21:36

## 2020-04-30 RX ADMIN — GABAPENTIN 100 MILLIGRAM(S): 400 CAPSULE ORAL at 11:57

## 2020-04-30 RX ADMIN — ALBUTEROL 2 PUFF(S): 90 AEROSOL, METERED ORAL at 18:02

## 2020-04-30 RX ADMIN — ENOXAPARIN SODIUM 40 MILLIGRAM(S): 100 INJECTION SUBCUTANEOUS at 11:57

## 2020-04-30 RX ADMIN — Medication 500 MILLIGRAM(S): at 11:57

## 2020-04-30 RX ADMIN — ALBUTEROL 2 PUFF(S): 90 AEROSOL, METERED ORAL at 11:59

## 2020-04-30 NOTE — PROGRESS NOTE ADULT - ASSESSMENT
89 y/o f PMHx of pulmonary fibrosis, pulmonary HTN, DM2, AS & MR, and constipation, BIBEMS to ED with right hip pain in setting of recent unwitnessed fall. Found to have R Hip fx. Cardiology is being consulted for clearance    Right hip fracture   - Surgery cancelled   - TTE showed EF 65%, shows Nl LV, mild AS, mild AI, trace MR, severe TR, severe Pul HTN  - Management per ortho and primary    Pulmonary HTN   - Continue nebs  - Titrate O2 as tolerated    Diabetes Mellitus  - Monitor blood sugars ac and hs  - Management per primary       - Monitor and replete lytes, keep K>4, Mg>2.  - All other medical needs as per primary team.  - Other cardiovascular workup will depend on clinical course.  - Will continue to follow.      Mikayla Martinez MS FNP, [facility]  Nurse Practitioner- Cardiology   Spectra #[phone]

## 2020-04-30 NOTE — PROGRESS NOTE ADULT - PROBLEM SELECTOR PLAN 1
- Patient s/p fall on right hip at nursing home  - Right femur XR:  Right femur is intact.  - Right hip XR: Displaced intertrochanteric fracture right hip  - COVID 19 negative  - family opting for non-operative treatment as patient very high risk for surgery with severe pulmonary hypertension. also aortic stenosis. diff maintaining appropriate sats with supplemental O2- able to tolerate to NC today  -Ortho following

## 2020-04-30 NOTE — PROGRESS NOTE ADULT - PROBLEM SELECTOR PLAN 1
non operative management -   Opioids PRN for pain management  assist with needs  monitor for issues  placement - [facility] - vs Hospice  pt is DNR DNI  valv heart disease - frailty - weakness - pulm HTN - OP - OA - ataxic gait - pulm fib.  prognosis POOR.   on proventil for chr lung disease  dc planning

## 2020-04-30 NOTE — PROGRESS NOTE ADULT - SUBJECTIVE AND OBJECTIVE BOX
89 year old female with PMHx of pulmonary fibrosis, pulmonary HTN, T2DM, Aortic Stenosis and mitral regurgitation, and constipation, BIBEMS to ED with right hip pain in setting of recent unwitnessed fall. Patient is a poor historian with little memory of the accident at the nursing home at [facility] in [place], [place]. Patient states the last thing she remembered was that she was using her walker to get to her room and found herself on the floor after. Patient states she thinks she hit her head but isn't sure. She states she found herself on the floor with severe pain in her right hip. Patient states this has never happened to her before. Of note patient is at [facility] in [place]. She denies headache, chest pain, fever and chills. Of note, patient was last hospitalized for Pneumonia in october 2019    ED Course: Vitals: Temp 97.4F oral, HR 77, /87, RR 15, O2 sat 98 on RA.  Labs: H/h 10.3/32.1, RDW 15.2, PT/INR 15/1.33, bicarb 34, anion gap 2, albumin 2.6  CXR: severe b/l intersitial fibrosis w/o focal infiltrate  Right femur XR:  Right femur is intact.  Right hip XR: Displaced intertrochanteric fracture right hip (27 Apr 2020 13:13)      ----  INTERVAL HPI/OVERNIGHT EVENTS: No acute overnight events noted. Patient stating she is feeling better, denies any CP, SOB, abd pain.    ROS: All other review of systems is negative unless indicated above.      MEDICATIONS  (STANDING):  ALBUTerol    90 MICROgram(s) HFA Inhaler 2 Puff(s) Inhalation every 6 hours  ascorbic acid 500 milliGRAM(s) Oral daily  cyanocobalamin 1000 MICROGram(s) Oral daily  dextrose 5%. 1000 milliLiter(s) (50 mL/Hr) IV Continuous <Continuous>  dextrose 50% Injectable 12.5 Gram(s) IV Push once  dextrose 50% Injectable 25 Gram(s) IV Push once  dextrose 50% Injectable 25 Gram(s) IV Push once  enoxaparin Injectable 40 milliGRAM(s) SubCutaneous daily  gabapentin 100 milliGRAM(s) Oral daily  insulin lispro (HumaLOG) corrective regimen sliding scale   SubCutaneous three times a day before meals  latanoprost 0.005% Ophthalmic Solution 1 Drop(s) Both EYES at bedtime  tiotropium 18 MICROgram(s) Capsule 1 Capsule(s) Inhalation daily  zinc sulfate 220 milliGRAM(s) Oral daily    MEDICATIONS  (PRN):  acetaminophen   Tablet .. 650 milliGRAM(s) Oral every 6 hours PRN Mild Pain (1 - 3)  bisacodyl Suppository 10 milliGRAM(s) Rectal daily PRN Constipation  dextrose 40% Gel 15 Gram(s) Oral once PRN Blood Glucose LESS THAN 70 milliGRAM(s)/deciliter  glucagon  Injectable 1 milliGRAM(s) IntraMuscular once PRN Glucose LESS THAN 70 milligrams/deciliter  morphine  - Injectable 2 milliGRAM(s) IV Push every 6 hours PRN Severe Pain (7 - 10)  traMADol 25 milliGRAM(s) Oral every 6 hours PRN Moderate Pain (4 - 6)      Allergies    No Known Allergies    Intolerances    lactose (Unknown)          Vital Signs Last 24 Hrs  T(C): 36.3 (30 Apr 2020 13:39), Max: 36.6 (29 Apr 2020 22:29)  T(F): 97.4 (30 Apr 2020 13:39), Max: 97.8 (29 Apr 2020 22:29)  HR: 92 (30 Apr 2020 13:39) (78 - 92)  BP: 118/74 (30 Apr 2020 13:39) (118/55 - 160/74)  BP(mean): --  RR: 28 (30 Apr 2020 14:57) (16 - 28)  SpO2: 97% (30 Apr 2020 14:57) (91% - 100%)    04-29 @ 07:01  -  04-30 @ 07:00  --------------------------------------------------------  IN: 290 mL / OUT: 450 mL / NET: -160 mL      Physical Exam:  General: WN/WD NAD  Neurology: A&Ox2, nonfocal, TAYLOR x 4  Respiratory: CTA B/L  CV: S1S2  Abdominal: Soft, NT, ND +BS  Extremities: No edema, RLE shortened, externally rotated, + peripheral pulses      LABS:                        9.2    6.51  )-----------( 176      ( 30 Apr 2020 07:56 )             29.1     04-30    143  |  100  |  26<H>  ----------------------------<  100<H>  3.8   |  33<H>  |  0.62    Ca    8.5      30 Apr 2020 07:56    TPro  6.1  /  Alb  2.3<L>  /  TBili  0.9  /  DBili  x   /  AST  15  /  ALT  15  /  AlkPhos  64  04-30          RADIOLOGY & ADDITIONAL TESTS:

## 2020-04-30 NOTE — PROGRESS NOTE ADULT - SUBJECTIVE AND OBJECTIVE BOX
[facility] -- Kenneth Roque Grossman, [place], Camden Winter [place], [place]: Office # [phone]    Follow Up:  Cardiac clearance, AS    Subjective/Observations: Patient seen and examined. Patient awake and alert, resting comfortably in bed. Tolerating nasal O2 via Ventimask. No complaints of chest pain, SOB, LE edema, cough. No signs of orthopnea or PND.    REVIEW OF SYSTEMS: All review of systems is negative for eye, ENT, GI, , allergic, dermatologic, musculoskeletal and neurologic except as described above    PAST MEDICAL & SURGICAL HISTORY:  Constipation  Pulmonary hypertension  Pulmonary fibrosis  DM (diabetes mellitus)  No significant past surgical history      MEDICATIONS  (STANDING):  ALBUTerol    90 MICROgram(s) HFA Inhaler 2 Puff(s) Inhalation every 6 hours  ascorbic acid 500 milliGRAM(s) Oral daily  cyanocobalamin 1000 MICROGram(s) Oral daily  dextrose 5%. 1000 milliLiter(s) (50 mL/Hr) IV Continuous <Continuous>  dextrose 50% Injectable 12.5 Gram(s) IV Push once  dextrose 50% Injectable 25 Gram(s) IV Push once  dextrose 50% Injectable 25 Gram(s) IV Push once  enoxaparin Injectable 40 milliGRAM(s) SubCutaneous daily  gabapentin 100 milliGRAM(s) Oral daily  insulin lispro (HumaLOG) corrective regimen sliding scale   SubCutaneous three times a day before meals  latanoprost 0.005% Ophthalmic Solution 1 Drop(s) Both EYES at bedtime  tiotropium 18 MICROgram(s) Capsule 1 Capsule(s) Inhalation daily  zinc sulfate 220 milliGRAM(s) Oral daily    MEDICATIONS  (PRN):  acetaminophen   Tablet .. 650 milliGRAM(s) Oral every 6 hours PRN Mild Pain (1 - 3)  bisacodyl Suppository 10 milliGRAM(s) Rectal daily PRN Constipation  dextrose 40% Gel 15 Gram(s) Oral once PRN Blood Glucose LESS THAN 70 milliGRAM(s)/deciliter  glucagon  Injectable 1 milliGRAM(s) IntraMuscular once PRN Glucose LESS THAN 70 milligrams/deciliter  morphine  - Injectable 2 milliGRAM(s) IV Push every 6 hours PRN Severe Pain (7 - 10)  traMADol 25 milliGRAM(s) Oral every 6 hours PRN Moderate Pain (4 - 6)      Allergies  No Known Allergies    Intolerances  lactose (Unknown)    Vital Signs Last 24 Hrs  T(C): 36.4 (30 Apr 2020 05:10), Max: 36.6 (29 Apr 2020 13:08)  T(F): 97.6 (30 Apr 2020 05:10), Max: 97.8 (29 Apr 2020 13:08)  HR: 78 (30 Apr 2020 05:10) (78 - 93)  BP: 118/55 (30 Apr 2020 05:10) (118/55 - 160/74)  BP(mean): --  RR: 18 (30 Apr 2020 10:10) (16 - 18)  SpO2: 100% (30 Apr 2020 10:10) (92% - 100%)    I&O's Summary    29 Apr 2020 07:01  -  30 Apr 2020 07:00  --------------------------------------------------------  IN: 290 mL / OUT: 450 mL / NET: -160 mL          TELE: Not on telemetry   PHYSICAL EXAM:  Appearance: NAD, no distress, alert, Frail   HEENT: Moist Mucous Membranes, Anicteric  Cardiovascular: Regular rate and rhythm, Normal S1 S2, No JVD, + murmurs, No rubs, gallops or clicks  Respiratory: Non-labored, Clear to auscultation, No rales, No rhonchi, No wheezing.   Gastrointestinal:  Soft, Non-tender, + BS  Neurologic: Non-focal  Skin: Warm and dry, No visible rashes or ulcers, No ecchymosis, No cyanosis  Musculoskeletal: No clubbing, No cyanosis, No joint swelling/tenderness  Psychiatry: Mood & affect appropriate  Lymph: No peripheral edema.     LABS: All Labs Reviewed:                        9.2    6.51  )-----------( 176      ( 30 Apr 2020 07:56 )             29.1                         10.9   8.59  )-----------( 175      ( 28 Apr 2020 09:51 )             33.6                         10.8   7.11  )-----------( 173      ( 28 Apr 2020 04:58 )             33.6     30 Apr 2020 07:56    143    |  100    |  26     ----------------------------<  100    3.8     |  33     |  0.62   28 Apr 2020 09:51    140    |  103    |  17     ----------------------------<  119    3.9     |  29     |  0.61   28 Apr 2020 04:58    144    |  104    |  18     ----------------------------<  118    4.1     |  34     |  0.60     Ca    8.5        30 Apr 2020 07:56  Ca    8.7        28 Apr 2020 09:51  Ca    8.7        28 Apr 2020 04:58    TPro  6.1    /  Alb  2.3    /  TBili  0.9    /  DBili  x      /  AST  15     /  ALT  15     /  AlkPhos  64     30 Apr 2020 07:56  TPro  6.7    /  Alb  2.5    /  TBili  1.0    /  DBili  x      /  AST  19     /  ALT  19     /  AlkPhos  76     28 Apr 2020 09:51  TPro  6.4    /  Alb  2.6    /  TBili  0.6    /  DBili  x      /  AST  19     /  ALT  21     /  AlkPhos  74     27 Apr 2020 12:00    12 Lead ECG:   Ventricular Rate 75 BPM  Atrial Rate 75 BPM  P-R Interval 152 ms  QRS Duration 88 ms  Q-T Interval 358 ms  QTC Calculation(Bezet) 399 ms  P Axis 43 degrees  R Axis -34 degrees  T Axis 7 degrees  Diagnosis Line Normal sinus rhythm  Left axis deviation  Moderate voltage criteria for LVH, may be normal variant  Abnormal ECG  No previous ECGs available  Confirmed by Keyon GARZA, Sonido ([id number]) on 4/27/2020 2:21:22 PM (04-27-20 @ 12:21)    ECHOCARDIOGRAM  < from: TTE Echo Complete w/o contrast w/ Doppler (04.28.20 @ 08:20) >  Dimensions:    LA 2.9       Normal Values: 2.0 - 4.0 cm    Ao 3.6        Normal Values: 2.0 - 3.8 cm  SEPTUM 1.0       Normal Values: 0.6 - 1.2 cm  PWT 1.2       Normal Values: 0.6 - 1.1 cm  LVIDd 3.7         Normal Values: 3.0 - 5.6 cm  LVIDs 2.1         Normal Values: 1.8 - 4.0 cm      OBSERVATIONS:  Technically difficult study  Mitral Valve: normal, mild MR.  Aortic Valve/Aorta: Calcified trileaflet aortic valve with likely mild aortic stenosis. Mild AI  Tricuspid Valve: Severe TR.  Pulmonic Valve: Mild PI  Left Atrium: normal  Right Atrium: Not well-visualized  Left Ventricle: normal LV size and systolic function, estimated LVEF of 65%.  Right Ventricle: Grossly normal size and systolic function.  Pericardium/Pleura: normal, no significant pericardial effusion.  Pulmonary/RV Pressure: estimated PA systolic pressure of 100 mmHg consistent with severe pulmonary hypertension  LV diastolic dysfunction is present    Conclusion:   Technically difficult study  Normal left ventricular internal dimensions and systolic function, estimated LVEF of 65%.   Grossly normal RV size and systolic function.    Calcified trileaflet aortic valve with likely mild aortic stenosis, mild AI.   Trace physiologic MR   Severe TR.    Estimated PA systolic pressure of 100 mmHg consistent with severe pulmonary hypertension  No significant pericardial effusion.    < end of copied text >    < from: CT Angio Chest w/ IV Cont (04.28.20 @ 08:58) >  FINDINGS:  LUNGS AND AIRWAYS: Patent central airways.  Severe bilateral interstitial fibrosis with subpleural predominance. Nonspecific dependent groundglass opacities at the lung bases could represent inflammatory/infectious process and/or pulmonary edema. Correlate clinically for active infection. Biapical scarring. No bryan lobar consolidation.  PLEURA: Trace left pleural effusion.  MEDIASTINUM AND TRINI: No lymphadenopathy.  VESSELS: Negative for pulmonary emboli. Prominent pulmonary arteries concerning for pulmonary hypertension. Atherosclerotic nonaneurysmal thoracic aorta..  HEART: Cardiomegaly with coronary artery calcification No pericardial effusion.  CHEST WALL AND LOWER NECK: Within normal limits.  VISUALIZED UPPER ABDOMEN: 4 mm nonobstructive left renal calculus.  BONES: Senescent/degenerative changes. Age-indeterminate mild superior endplate compression deformity upper thoracic vertebral body probably chronic. A nonspecific sclerotic focus involving a midthoracic vertebral body..    IMPRESSION:   Negative for pulmonary emboli.  Severe interstitial fibrosis.  Nonspecific dependent groundglass opacity at the lung bases may reflect inflammatory/infectious process and/or pulmonary edema.  Additional findings as discussed    < end of copied text >    < from: Xray Chest 1 View-PORTABLE IMMEDIATE (04.27.20 @ 12:57) >  Findings:  Heart magnified by projection. Atherosclerotic aorta. There is severe diffuse bilateral nearly symmetric interstitial fibrosis. No definite focal infiltrate. No bryan parenchymal consolidation or pleural effusion.    Impression:  1.  Severe bilateral interstitial fibrosis without gross focal infiltrate.    DISCRETE X-RAY DATA:  Percent of LEFT lung opacification: %  Percent of RIGHT lung opacification: %  Change in lung opacification from most recent x-ray (<=3 days): No Prior  Change from prior dated 3 or more days (same admission): No Prior    < end of copied text >

## 2020-05-01 ENCOUNTER — TRANSCRIPTION ENCOUNTER (OUTPATIENT)
Age: 85
End: 2020-05-01

## 2020-05-01 VITALS
HEART RATE: 91 BPM | OXYGEN SATURATION: 95 % | RESPIRATION RATE: 19 BRPM | TEMPERATURE: 98 F | SYSTOLIC BLOOD PRESSURE: 148 MMHG | DIASTOLIC BLOOD PRESSURE: 79 MMHG

## 2020-05-01 LAB
ALBUMIN SERPL ELPH-MCNC: 2.4 G/DL — LOW (ref 3.3–5)
ALP SERPL-CCNC: 61 U/L — SIGNIFICANT CHANGE UP (ref 40–120)
ALT FLD-CCNC: 15 U/L — SIGNIFICANT CHANGE UP (ref 12–78)
ANION GAP SERPL CALC-SCNC: 11 MMOL/L — SIGNIFICANT CHANGE UP (ref 5–17)
AST SERPL-CCNC: 17 U/L — SIGNIFICANT CHANGE UP (ref 15–37)
BILIRUB SERPL-MCNC: 0.9 MG/DL — SIGNIFICANT CHANGE UP (ref 0.2–1.2)
BUN SERPL-MCNC: 25 MG/DL — HIGH (ref 7–23)
CALCIUM SERPL-MCNC: 8.7 MG/DL — SIGNIFICANT CHANGE UP (ref 8.5–10.1)
CHLORIDE SERPL-SCNC: 101 MMOL/L — SIGNIFICANT CHANGE UP (ref 96–108)
CO2 SERPL-SCNC: 32 MMOL/L — HIGH (ref 22–31)
CREAT SERPL-MCNC: 0.48 MG/DL — LOW (ref 0.5–1.3)
GLUCOSE SERPL-MCNC: 72 MG/DL — SIGNIFICANT CHANGE UP (ref 70–99)
HCT VFR BLD CALC: 29.4 % — LOW (ref 34.5–45)
HGB BLD-MCNC: 9.3 G/DL — LOW (ref 11.5–15.5)
MCHC RBC-ENTMCNC: 29.3 PG — SIGNIFICANT CHANGE UP (ref 27–34)
MCHC RBC-ENTMCNC: 31.6 GM/DL — LOW (ref 32–36)
MCV RBC AUTO: 92.7 FL — SIGNIFICANT CHANGE UP (ref 80–100)
NRBC # BLD: 0 /100 WBCS — SIGNIFICANT CHANGE UP (ref 0–0)
PLATELET # BLD AUTO: 209 K/UL — SIGNIFICANT CHANGE UP (ref 150–400)
POTASSIUM SERPL-MCNC: 3.7 MMOL/L — SIGNIFICANT CHANGE UP (ref 3.5–5.3)
POTASSIUM SERPL-SCNC: 3.7 MMOL/L — SIGNIFICANT CHANGE UP (ref 3.5–5.3)
PROT SERPL-MCNC: 6.3 G/DL — SIGNIFICANT CHANGE UP (ref 6–8.3)
RBC # BLD: 3.17 M/UL — LOW (ref 3.8–5.2)
RBC # FLD: 15.3 % — HIGH (ref 10.3–14.5)
SODIUM SERPL-SCNC: 144 MMOL/L — SIGNIFICANT CHANGE UP (ref 135–145)
WBC # BLD: 5.3 K/UL — SIGNIFICANT CHANGE UP (ref 3.8–10.5)
WBC # FLD AUTO: 5.3 K/UL — SIGNIFICANT CHANGE UP (ref 3.8–10.5)

## 2020-05-01 PROCEDURE — 85027 COMPLETE CBC AUTOMATED: CPT

## 2020-05-01 PROCEDURE — 80053 COMPREHEN METABOLIC PANEL: CPT

## 2020-05-01 PROCEDURE — 71045 X-RAY EXAM CHEST 1 VIEW: CPT

## 2020-05-01 PROCEDURE — 96374 THER/PROPH/DIAG INJ IV PUSH: CPT

## 2020-05-01 PROCEDURE — 86850 RBC ANTIBODY SCREEN: CPT

## 2020-05-01 PROCEDURE — 85730 THROMBOPLASTIN TIME PARTIAL: CPT

## 2020-05-01 PROCEDURE — 97162 PT EVAL MOD COMPLEX 30 MIN: CPT

## 2020-05-01 PROCEDURE — 82803 BLOOD GASES ANY COMBINATION: CPT

## 2020-05-01 PROCEDURE — 93005 ELECTROCARDIOGRAM TRACING: CPT

## 2020-05-01 PROCEDURE — 86900 BLOOD TYPING SEROLOGIC ABO: CPT

## 2020-05-01 PROCEDURE — 83036 HEMOGLOBIN GLYCOSYLATED A1C: CPT

## 2020-05-01 PROCEDURE — 73552 X-RAY EXAM OF FEMUR 2/>: CPT

## 2020-05-01 PROCEDURE — 71275 CT ANGIOGRAPHY CHEST: CPT

## 2020-05-01 PROCEDURE — 86901 BLOOD TYPING SEROLOGIC RH(D): CPT

## 2020-05-01 PROCEDURE — 36415 COLL VENOUS BLD VENIPUNCTURE: CPT

## 2020-05-01 PROCEDURE — 99285 EMERGENCY DEPT VISIT HI MDM: CPT | Mod: 25

## 2020-05-01 PROCEDURE — 80048 BASIC METABOLIC PNL TOTAL CA: CPT

## 2020-05-01 PROCEDURE — 94640 AIRWAY INHALATION TREATMENT: CPT

## 2020-05-01 PROCEDURE — 73502 X-RAY EXAM HIP UNI 2-3 VIEWS: CPT

## 2020-05-01 PROCEDURE — 87635 SARS-COV-2 COVID-19 AMP PRB: CPT

## 2020-05-01 PROCEDURE — 99239 HOSP IP/OBS DSCHRG MGMT >30: CPT

## 2020-05-01 PROCEDURE — 82962 GLUCOSE BLOOD TEST: CPT

## 2020-05-01 PROCEDURE — 81001 URINALYSIS AUTO W/SCOPE: CPT

## 2020-05-01 PROCEDURE — 99232 SBSQ HOSP IP/OBS MODERATE 35: CPT

## 2020-05-01 PROCEDURE — 93306 TTE W/DOPPLER COMPLETE: CPT

## 2020-05-01 PROCEDURE — 85610 PROTHROMBIN TIME: CPT

## 2020-05-01 RX ORDER — TRAMADOL HYDROCHLORIDE 50 MG/1
0.5 TABLET ORAL
Qty: 0 | Refills: 0 | DISCHARGE
Start: 2020-05-01

## 2020-05-01 RX ORDER — PANTOPRAZOLE SODIUM 20 MG/1
1 TABLET, DELAYED RELEASE ORAL
Qty: 0 | Refills: 0 | DISCHARGE

## 2020-05-01 RX ORDER — ENOXAPARIN SODIUM 100 MG/ML
40 INJECTION SUBCUTANEOUS
Qty: 0 | Refills: 0 | DISCHARGE
Start: 2020-05-01

## 2020-05-01 RX ORDER — INSULIN HUMAN 100 [IU]/ML
0 INJECTION, SOLUTION SUBCUTANEOUS
Qty: 0 | Refills: 0 | DISCHARGE

## 2020-05-01 RX ADMIN — ALBUTEROL 2 PUFF(S): 90 AEROSOL, METERED ORAL at 11:25

## 2020-05-01 RX ADMIN — PREGABALIN 1000 MICROGRAM(S): 225 CAPSULE ORAL at 11:22

## 2020-05-01 RX ADMIN — TIOTROPIUM BROMIDE 1 CAPSULE(S): 18 CAPSULE ORAL; RESPIRATORY (INHALATION) at 06:39

## 2020-05-01 RX ADMIN — ALBUTEROL 2 PUFF(S): 90 AEROSOL, METERED ORAL at 17:07

## 2020-05-01 RX ADMIN — MORPHINE SULFATE 2 MILLIGRAM(S): 50 CAPSULE, EXTENDED RELEASE ORAL at 05:20

## 2020-05-01 RX ADMIN — ALBUTEROL 2 PUFF(S): 90 AEROSOL, METERED ORAL at 01:08

## 2020-05-01 RX ADMIN — GABAPENTIN 100 MILLIGRAM(S): 400 CAPSULE ORAL at 11:22

## 2020-05-01 RX ADMIN — ALBUTEROL 2 PUFF(S): 90 AEROSOL, METERED ORAL at 06:38

## 2020-05-01 RX ADMIN — Medication 500 MILLIGRAM(S): at 11:22

## 2020-05-01 RX ADMIN — ZINC SULFATE TAB 220 MG (50 MG ZINC EQUIVALENT) 220 MILLIGRAM(S): 220 (50 ZN) TAB at 11:26

## 2020-05-01 RX ADMIN — ENOXAPARIN SODIUM 40 MILLIGRAM(S): 100 INJECTION SUBCUTANEOUS at 11:22

## 2020-05-01 NOTE — PROGRESS NOTE ADULT - PROBLEM SELECTOR PLAN 1
non operative management -   Opioids PRN for pain management  assist with needs  monitor for issues  placement - [facility] - vs Hospice  pt is DNR [facility]NI  valv heart disease - frailty - weakness - pulm HTN - OP - OA - ataxic gait - pulm fib.  prognosis POOR.   on proventil PRN and Spiriva for chr lung disease  dc planning.

## 2020-05-01 NOTE — ADVANCED PRACTICE NURSE CONSULT - ASSESSMENT
Vital Signs Last 24 Hrs  T(C): 36.3 (01 May 2020 05:00), Max: 36.6 (30 Apr 2020 21:57)  T(F): 97.3 (01 May 2020 05:00), Max: 97.8 (30 Apr 2020 21:57)  HR: 75 (01 May 2020 05:00) (75 - 95)  BP: 124/68 (01 May 2020 05:00) (118/74 - 135/88)  BP(mean): --  RR: 18 (01 May 2020 05:00) (18 - 28)  SpO2: 97% (01 May 2020 05:00) (91% - 100%)     eGFR if Non African American: 79 mL/min/1.73M2 (04-30-20 @ 07:56)  eGFR if : 91 mL/min/1.73M2 (04-30-20 @ 07:56)  eGFR if Non African American: 79 mL/min/1.73M2 (04-28-20 @ 09:51)  eGFR if : 92 mL/min/1.73M2 (04-28-20 @ 09:51)      CAPILLARY BLOOD GLUCOSE      POCT Blood Glucose.: 90 mg/dL (01 May 2020 07:32)  POCT Blood Glucose.: 95 mg/dL (30 Apr 2020 21:44)  POCT Blood Glucose.: 94 mg/dL (30 Apr 2020 16:38)  POCT Blood Glucose.: 99 mg/dL (30 Apr 2020 16:30)  POCT Blood Glucose.: 108 mg/dL (30 Apr 2020 11:41)      DIET: CC  %

## 2020-05-01 NOTE — PHYSICAL THERAPY INITIAL EVALUATION ADULT - PERTINENT HX OF CURRENT PROBLEM, REHAB EVAL
89 y/o female adm 4/27 from [facility]  with R femoral neck fx, NWB RLE. Pt is non surgical secondary to high risk

## 2020-05-01 NOTE — PHYSICAL THERAPY INITIAL EVALUATION ADULT - TRANSFER TRAINING, PT EVAL
STG (3-5 sessions) sit to stand/ stand to sit Adriana x2.  STG (3-5 sessions) bed to chair/ chair to bed Adriana x2

## 2020-05-01 NOTE — ADVANCED PRACTICE NURSE CONSULT - REASON FOR CONSULT
89y    Female    Patient is a 89y old  Female who presents with a chief complaint of Right hip injury (01 May 2020 06:35)      Type:2 DX, a1c 5.9% BG <100 mg/dL. humulin on med rec for home meds- recommend dc to avoid hypoglycemia risk.     HPI:  89 year old female with PMHx of pulmonary fibrosis, pulmonary HTN, T2DM, Aortic Stenosis and mitral regurgitation, and constipation, BIBEMS to ED with right hip pain in setting of recent unwitnessed fall. Patient is a poor historian with little memory of the accident at the nursing home at [facility] in [place], [place]. Patient states the last thing she remembered was that she was using her walker to get to her room and found herself on the floor after. Patient states she thinks she hit her head but isn't sure. She states she found herself on the floor with severe pain in her right hip. Patient states this has never happened to her before. Of note patient is at [facility] in [place]. She denies headache, chest pain, fever and chills. Of note, patient was last hospitalized for Pneumonia in october 2019    ED Course: Vitals: Temp 97.4F oral, HR 77, /87, RR 15, O2 sat 98 on RA.  Labs: H/h 10.3/32.1, RDW 15.2, PT/INR 15/1.33, bicarb 34, anion gap 2, albumin 2.6  CXR: severe b/l intersitial fibrosis w/o focal infiltrate  Right femur XR:  Right femur is intact.  Right hip XR: Displaced intertrochanteric fracture right hip (27 Apr 2020 13:13)      PAST MEDICAL & SURGICAL HISTORY:  Constipation  Pulmonary hypertension  Pulmonary fibrosis  DM (diabetes mellitus)  No significant past surgical history      MEDICATIONS  (STANDING):  ALBUTerol    90 MICROgram(s) HFA Inhaler 2 Puff(s) Inhalation every 6 hours  ascorbic acid 500 milliGRAM(s) Oral daily  cyanocobalamin 1000 MICROGram(s) Oral daily  dextrose 5%. 1000 milliLiter(s) (50 mL/Hr) IV Continuous <Continuous>  dextrose 50% Injectable 12.5 Gram(s) IV Push once  dextrose 50% Injectable 25 Gram(s) IV Push once  dextrose 50% Injectable 25 Gram(s) IV Push once  enoxaparin Injectable 40 milliGRAM(s) SubCutaneous daily  gabapentin 100 milliGRAM(s) Oral daily  latanoprost 0.005% Ophthalmic Solution 1 Drop(s) Both EYES at bedtime  tiotropium 18 MICROgram(s) Capsule 1 Capsule(s) Inhalation daily  zinc sulfate 220 milliGRAM(s) Oral daily

## 2020-05-01 NOTE — PROGRESS NOTE ADULT - SUBJECTIVE AND OBJECTIVE BOX
Date/Time Patient Seen:  		  Referring MD:   Data Reviewed	       Patient is a 89y old  Female who presents with a chief complaint of Right hip injury (30 Apr 2020 17:30)      Subjective/HPI     PAST MEDICAL & SURGICAL HISTORY:  Constipation  Pulmonary hypertension  Pulmonary fibrosis  DM (diabetes mellitus)  No significant past surgical history        Medication list         MEDICATIONS  (STANDING):  ALBUTerol    90 MICROgram(s) HFA Inhaler 2 Puff(s) Inhalation every 6 hours  ascorbic acid 500 milliGRAM(s) Oral daily  cyanocobalamin 1000 MICROGram(s) Oral daily  dextrose 5%. 1000 milliLiter(s) (50 mL/Hr) IV Continuous <Continuous>  dextrose 50% Injectable 12.5 Gram(s) IV Push once  dextrose 50% Injectable 25 Gram(s) IV Push once  dextrose 50% Injectable 25 Gram(s) IV Push once  enoxaparin Injectable 40 milliGRAM(s) SubCutaneous daily  gabapentin 100 milliGRAM(s) Oral daily  insulin lispro (HumaLOG) corrective regimen sliding scale   SubCutaneous three times a day before meals  latanoprost 0.005% Ophthalmic Solution 1 Drop(s) Both EYES at bedtime  tiotropium 18 MICROgram(s) Capsule 1 Capsule(s) Inhalation daily  zinc sulfate 220 milliGRAM(s) Oral daily    MEDICATIONS  (PRN):  acetaminophen   Tablet .. 650 milliGRAM(s) Oral every 6 hours PRN Mild Pain (1 - 3)  bisacodyl Suppository 10 milliGRAM(s) Rectal daily PRN Constipation  dextrose 40% Gel 15 Gram(s) Oral once PRN Blood Glucose LESS THAN 70 milliGRAM(s)/deciliter  glucagon  Injectable 1 milliGRAM(s) IntraMuscular once PRN Glucose LESS THAN 70 milligrams/deciliter  morphine  - Injectable 2 milliGRAM(s) IV Push every 6 hours PRN Severe Pain (7 - 10)  traMADol 25 milliGRAM(s) Oral every 6 hours PRN Moderate Pain (4 - 6)         Vitals log        ICU Vital Signs Last 24 Hrs  T(C): 36.3 (01 May 2020 05:00), Max: 36.6 (30 Apr 2020 21:57)  T(F): 97.3 (01 May 2020 05:00), Max: 97.8 (30 Apr 2020 21:57)  HR: 75 (01 May 2020 05:00) (75 - 95)  BP: 124/68 (01 May 2020 05:00) (118/74 - 135/88)  BP(mean): --  ABP: --  ABP(mean): --  RR: 18 (01 May 2020 05:00) (18 - 28)  SpO2: 97% (01 May 2020 05:00) (91% - 100%)           Input and Output:  I&O's Detail    29 Apr 2020 07:01  -  30 Apr 2020 07:00  --------------------------------------------------------  IN:    Oral Fluid: 290 mL  Total IN: 290 mL    OUT:    Voided: 450 mL  Total OUT: 450 mL    Total NET: -160 mL      30 Apr 2020 07:01  -  01 May 2020 06:35  --------------------------------------------------------  IN:  Total IN: 0 mL    OUT:    Voided: 300 mL  Total OUT: 300 mL    Total NET: -300 mL          Lab Data                        9.2    6.51  )-----------( 176      ( 30 Apr 2020 07:56 )             29.1     04-30    143  |  100  |  26<H>  ----------------------------<  100<H>  3.8   |  33<H>  |  0.62    Ca    8.5      30 Apr 2020 07:56    TPro  6.1  /  Alb  2.3<L>  /  TBili  0.9  /  DBili  x   /  AST  15  /  ALT  15  /  AlkPhos  64  04-30            Review of Systems	      Objective     Physical Examination    heart s1s2  lung dec BS      Pertinent Lab findings & Imaging      Heri:  NO   Adequate UO     I&O's Detail    29 Apr 2020 07:01  -  30 Apr 2020 07:00  --------------------------------------------------------  IN:    Oral Fluid: 290 mL  Total IN: 290 mL    OUT:    Voided: 450 mL  Total OUT: 450 mL    Total NET: -160 mL      30 Apr 2020 07:01  -  01 May 2020 06:35  --------------------------------------------------------  IN:  Total IN: 0 mL    OUT:    Voided: 300 mL  Total OUT: 300 mL    Total NET: -300 mL               Discussed with:     Cultures:	        Radiology

## 2020-05-01 NOTE — DISCHARGE NOTE PROVIDER - NSDCCPCAREPLAN_GEN_ALL_CORE_FT
PRINCIPAL DISCHARGE DIAGNOSIS  Diagnosis: Closed fracture of right hip, initial encounter  Assessment and Plan of Treatment: DC Instructions:  1.	Analgesia  2.	Non-Weight Bearing RLE Extremity, with assistive device/rolling walker  3.	Continue DVT Prophylaxis.   4.  Take Protonix while on Lovenox  5.	PT daily  6.	Follow up with Orthopedic Surgeon Dr. Garcia in 14 Days. Call Office For Appointment. Repeat x-rays in office.  or put any body weight on splint because it will break.      SECONDARY DISCHARGE DIAGNOSES  Diagnosis: Pulmonary fibrosis  Assessment and Plan of Treatment: -Continue medications as prescribed  -Follow up with Pulmonology within 2 weeks of discharge

## 2020-05-01 NOTE — DISCHARGE NOTE PROVIDER - CARE PROVIDERS DIRECT ADDRESSES
,DirectAddress_Unknown,[email or web address],DirectAddress_Unknown

## 2020-05-01 NOTE — DISCHARGE NOTE PROVIDER - HOSPITAL COURSE
FROM ADMISSION H+P:     HPI:    89 year old female with PMHx of pulmonary fibrosis, pulmonary HTN, T2DM, Aortic Stenosis and mitral regurgitation, and constipation, BIBEMS to ED with right hip pain in setting of recent unwitnessed fall. Patient is a poor historian with little memory of the accident at the nursing home at [facility] in [place], [place]. Patient states the last thing she remembered was that she was using her walker to get to her room and found herself on the floor after. Patient states she thinks she hit her head but isn't sure. She states she found herself on the floor with severe pain in her right hip. Patient states this has never happened to her before. Of note patient is at [facility] in [place]. She denies headache, chest pain, fever and chills. Of note, patient was last hospitalized for Pneumonia in october 2019        ED Course: Vitals: Temp 97.4F oral, HR 77, /87, RR 15, O2 sat 98 on RA.    Labs: H/h 10.3/32.1, RDW 15.2, PT/INR 15/1.33, bicarb 34, anion gap 2, albumin 2.6    CXR: severe b/l intersitial fibrosis w/o focal infiltrate    Right femur XR:  Right femur is intact.    Right hip XR: Displaced intertrochanteric fracture right hip (27 Apr 2020 13:13)            ---    HOSPITAL COURSE:                 Patient improved clinically throughout hospital course. Patient seen and examined on day of discharge.        Vital Signs Last 24 Hrs    T(C): 36.3 (01 May 2020 13:09), Max: 36.6 (30 Apr 2020 21:57)    T(F): 97.3 (01 May 2020 13:09), Max: 97.8 (30 Apr 2020 21:57)    HR: 85 (01 May 2020 13:09) (75 - 95)    BP: 156/78 (01 May 2020 13:09) (124/68 - 156/78)    BP(mean): --    RR: 18 (01 May 2020 13:09) (18 - 22)    SpO2: 99% (01 May 2020 13:09) (94% - 100%)        Physical Exam:    General: Well developed, well nourished, in no acute distress    HEENT: NCAT, PERRLA, EOMI bl, moist mucous membranes     Neck: Supple, nontender, no mass    Neurology: A&Ox3, nonfocal, CN II-XII grossly intact, sensation intact, no gait abnormalities     Respiratory: CTA B/L, No wheezing, rales, or rhonchi    CV: RRR, S1/S2 present, no murmurs, rubs, or gallops    Abdominal: Soft, nontender, non-distended, normoactive bowel sounds    Extremities: No C/C/E, peripheral pulses present    MSK: Normal ROM, no joint erythema or warmth, no joint swelling     Skin: warm, dry, normal color, no obvious rash or abnormal lesions        Patient is medically stable for discharge to ____ with outpatient follow up.        ---    CONSULTANTS: FROM ADMISSION H+P:     HPI:    89 year old female with PMHx of pulmonary fibrosis, pulmonary HTN, T2DM, Aortic Stenosis and mitral regurgitation, and constipation, BIBEMS to ED with right hip pain in setting of recent unwitnessed fall. Patient is a poor historian with little memory of the accident at the nursing home at [facility] in [place], [place]. Patient states the last thing she remembered was that she was using her walker to get to her room and found herself on the floor after. Patient states she thinks she hit her head but isn't sure. She states she found herself on the floor with severe pain in her right hip. Patient states this has never happened to her before. Of note patient is at [facility] in [place]. She denies headache, chest pain, fever and chills. Of note, patient was last hospitalized for Pneumonia in october 2019        ED Course: Vitals: Temp 97.4F oral, HR 77, /87, RR 15, O2 sat 98 on RA.    Labs: H/h 10.3/32.1, RDW 15.2, PT/INR 15/1.33, bicarb 34, anion gap 2, albumin 2.6    CXR: severe b/l intersitial fibrosis w/o focal infiltrate    Right femur XR:  Right femur is intact.    Right hip XR: Displaced intertrochanteric fracture right hip (27 Apr 2020 13:13)            ---    HOSPITAL COURSE: Patient seen by Orthopedics, Pulmonology and Cardiology. Given patient with pulmonary fibrosis, severe AS, patient high risk for surgery. Discussed options with family and family opted for non surgical management. Patient's pain managed with pain medications. O2 titrated to NC and patient doing well. Patient evaluated by PT and recommending TAMIKA.        Patient improved clinically throughout hospital course. Patient seen and examined on day of discharge.        Vital Signs Last 24 Hrs    T(C): 36.3 (01 May 2020 13:09), Max: 36.6 (30 Apr 2020 21:57)    T(F): 97.3 (01 May 2020 13:09), Max: 97.8 (30 Apr 2020 21:57)    HR: 85 (01 May 2020 13:09) (75 - 95)    BP: 156/78 (01 May 2020 13:09) (124/68 - 156/78)    BP(mean): --    RR: 18 (01 May 2020 13:09) (18 - 22)    SpO2: 99% (01 May 2020 13:09) (94% - 100%)        Physical Exam:    General: WN/WD NAD    Neurology: A&Ox2, nonfocal, TAYLOR x 4    Respiratory: CTA B/L    CV: S1S2    Abdominal: Soft, NT, ND +BS    Extremities: No edema, RLE shortened, externally rotated, + peripheral pulses        ---    CONSULTANTS:     Ortho (Dr. Garcia)    Cardio Dr. Barillas)    Pulm (Dr. Trimble)        Total time spent on discharge including coordination of care: 43 minutes

## 2020-05-01 NOTE — DISCHARGE NOTE PROVIDER - NSDCMRMEDTOKEN_GEN_ALL_CORE_FT
acetaminophen 650 mg oral tablet: orally every 6 hours, As Needed  cyanocobalamin 1000 mcg oral tablet, extended release: 1 tab(s) orally once a day  Dulcolax Laxative 10 mg rectal suppository: 1 suppository(ies) rectal once a day, As Needed  DuoNeb 0.5 mg-2.5 mg/3 mL inhalation solution: 3 milliliter(s) inhaled 4 times a day  Fleet Enema 7 g-19 g rectal enema:   gabapentin 100 mg oral tablet: orally once a day (at bedtime)  Hydrocort 2.5% topical cream: Apply topically to affected area 2 times a day apply to neck and chest  Icy Hot topical cream: apply to right arm once daily for 30 days   latanoprost 0.005% ophthalmic solution: 1 drop(s) to each affected eye once a day (in the evening)  Milk of Magnesia:   Simbrinza 1%- 0.2% ophthalmic suspension: 1 drop(s) to each affected eye 2 times a day  Vitamin C 500 mg oral tablet: 1 tab(s) orally once a day  Vitamin D3 5000 intl units (125 mcg) oral tablet: 1 tab(s) orally once a day  zinc sulfate 220 mg oral capsule: orally once a day acetaminophen 650 mg oral tablet: orally every 6 hours, As Needed  cyanocobalamin 1000 mcg oral tablet, extended release: 1 tab(s) orally once a day  Dulcolax Laxative 10 mg rectal suppository: 1 suppository(ies) rectal once a day, As Needed  DuoNeb 0.5 mg-2.5 mg/3 mL inhalation solution: 3 milliliter(s) inhaled 4 times a day  enoxaparin: 40 milligram(s) subcutaneous once a day  Fleet Enema 7 g-19 g rectal enema:   gabapentin 100 mg oral tablet: orally once a day (at bedtime)  Hydrocort 2.5% topical cream: Apply topically to affected area 2 times a day apply to neck and chest  Icy Hot topical cream: apply to right arm once daily for 30 days   latanoprost 0.005% ophthalmic solution: 1 drop(s) to each affected eye once a day (in the evening)  Milk of Magnesia:   Protonix 40 mg oral delayed release tablet: 1 tab(s) orally once a day  Simbrinza 1%- 0.2% ophthalmic suspension: 1 drop(s) to each affected eye 2 times a day  traMADol 50 mg oral tablet: 0.5 tab(s) orally every 6 hours, As needed, Moderate Pain (4 - 6)  Vitamin C 500 mg oral tablet: 1 tab(s) orally once a day  Vitamin D3 5000 intl units (125 mcg) oral tablet: 1 tab(s) orally once a day  zinc sulfate 220 mg oral capsule: orally once a day

## 2020-05-01 NOTE — PROGRESS NOTE ADULT - SUBJECTIVE AND OBJECTIVE BOX
[facility] -- Kenneth Roque Grossman, [place], Camden Winter Savella, [place]: Office # [phone]    Follow Up:  Cardiac clearance, AS    Subjective/Observations: Patient seen and examined. Patient awake and alert, resting comfortably in bed. Tolerating nasal O2 via NC. No complaints of chest pain, SOB, LE edema, cough. No signs of orthopnea or PND.    REVIEW OF SYSTEMS: All review of systems is negative for eye, ENT, GI, , allergic, dermatologic, musculoskeletal and neurologic except as described above    PAST MEDICAL & SURGICAL HISTORY:  Constipation  Pulmonary hypertension  Pulmonary fibrosis  DM (diabetes mellitus)  No significant past surgical history    MEDICATIONS  (STANDING):  ALBUTerol    90 MICROgram(s) HFA Inhaler 2 Puff(s) Inhalation every 6 hours  ascorbic acid 500 milliGRAM(s) Oral daily  cyanocobalamin 1000 MICROGram(s) Oral daily  dextrose 5%. 1000 milliLiter(s) (50 mL/Hr) IV Continuous <Continuous>  dextrose 50% Injectable 12.5 Gram(s) IV Push once  dextrose 50% Injectable 25 Gram(s) IV Push once  dextrose 50% Injectable 25 Gram(s) IV Push once  enoxaparin Injectable 40 milliGRAM(s) SubCutaneous daily  gabapentin 100 milliGRAM(s) Oral daily  latanoprost 0.005% Ophthalmic Solution 1 Drop(s) Both EYES at bedtime  tiotropium 18 MICROgram(s) Capsule 1 Capsule(s) Inhalation daily  zinc sulfate 220 milliGRAM(s) Oral daily    MEDICATIONS  (PRN):  acetaminophen   Tablet .. 650 milliGRAM(s) Oral every 6 hours PRN Mild Pain (1 - 3)  bisacodyl Suppository 10 milliGRAM(s) Rectal daily PRN Constipation  dextrose 40% Gel 15 Gram(s) Oral once PRN Blood Glucose LESS THAN 70 milliGRAM(s)/deciliter  glucagon  Injectable 1 milliGRAM(s) IntraMuscular once PRN Glucose LESS THAN 70 milligrams/deciliter  morphine  - Injectable 2 milliGRAM(s) IV Push every 6 hours PRN Severe Pain (7 - 10)  traMADol 25 milliGRAM(s) Oral every 6 hours PRN Moderate Pain (4 - 6)    Allergies  No Known Allergies    Intolerances  lactose (Unknown)    Vital Signs Last 24 Hrs  T(C): 36.3 (01 May 2020 05:00), Max: 36.6 (30 Apr 2020 21:57)  T(F): 97.3 (01 May 2020 05:00), Max: 97.8 (30 Apr 2020 21:57)  HR: 75 (01 May 2020 05:00) (75 - 95)  BP: 124/68 (01 May 2020 05:00) (118/74 - 135/88)  BP(mean): --  RR: 18 (01 May 2020 05:00) (18 - 28)  SpO2: 94% (01 May 2020 08:09) (91% - 100%)    I&O's Summary    30 Apr 2020 07:01  -  01 May 2020 07:00  --------------------------------------------------------  IN: 0 mL / OUT: 300 mL / NET: -300 mL    TELE: Not on telemetry   PHYSICAL EXAM:  Appearance: NAD, no distress, alert, Frail   HEENT: Moist Mucous Membranes, Anicteric  Cardiovascular: Regular rate and rhythm, Normal S1 S2, No JVD, + murmurs, No rubs, gallops or clicks  Respiratory: Non-labored, Clear to auscultation, No rales, No rhonchi, No wheezing.   Gastrointestinal:  Soft, Non-tender, + BS  Neurologic: Non-focal  Skin: Warm and dry, No visible rashes or ulcers, No ecchymosis, No cyanosis  Musculoskeletal: No clubbing, No cyanosis, No joint swelling/tenderness  Psychiatry: Mood & affect appropriate  Lymph: No peripheral edema.     LABS: All Labs Reviewed:                        9.3    5.30  )-----------( 209      ( 01 May 2020 08:01 )             29.4                         9.2    6.51  )-----------( 176      ( 30 Apr 2020 07:56 )             29.1                         10.9   8.59  )-----------( 175      ( 28 Apr 2020 09:51 )             33.6     01 May 2020 08:01    144    |  101    |  25     ----------------------------<  72     3.7     |  32     |  0.48   30 Apr 2020 07:56    143    |  100    |  26     ----------------------------<  100    3.8     |  33     |  0.62   28 Apr 2020 09:51    140    |  103    |  17     ----------------------------<  119    3.9     |  29     |  0.61     Ca    8.7        01 May 2020 08:01  Ca    8.5        30 Apr 2020 07:56  Ca    8.7        28 Apr 2020 09:51    TPro  6.3    /  Alb  2.4    /  TBili  0.9    /  DBili  x      /  AST  17     /  ALT  15     /  AlkPhos  61     01 May 2020 08:01  TPro  6.1    /  Alb  2.3    /  TBili  0.9    /  DBili  x      /  AST  15     /  ALT  15     /  AlkPhos  64     30 Apr 2020 07:56  TPro  6.7    /  Alb  2.5    /  TBili  1.0    /  DBili  x      /  AST  19     /  ALT  19     /  AlkPhos  76     28 Apr 2020 09:51    12 Lead ECG:   Ventricular Rate 75 BPM  Atrial Rate 75 BPM  P-R Interval 152 ms  QRS Duration 88 ms  Q-T Interval 358 ms  QTC Calculation(Bezet) 399 ms  P Axis 43 degrees  R Axis -34 degrees  T Axis 7 degrees  Diagnosis Line Normal sinus rhythm  Left axis deviation  Moderate voltage criteria for LVH, may be normal variant  Abnormal ECG  No previous ECGs available  Confirmed by Sonido Ta MD ([id number]) on 4/27/2020 2:21:22 PM (04-27-20 @ 12:21)    ECHOCARDIOGRAM  < from: TTE Echo Complete w/o contrast w/ Doppler (04.28.20 @ 08:20) >  Dimensions:    LA 2.9       Normal Values: 2.0 - 4.0 cm    Ao 3.6        Normal Values: 2.0 - 3.8 cm  SEPTUM 1.0       Normal Values: 0.6 - 1.2 cm  PWT 1.2       Normal Values: 0.6 - 1.1 cm  LVIDd 3.7         Normal Values: 3.0 - 5.6 cm  LVIDs 2.1         Normal Values: 1.8 - 4.0 cm      OBSERVATIONS:  Technically difficult study  Mitral Valve: normal, mild MR.  Aortic Valve/Aorta: Calcified trileaflet aortic valve with likely mild aortic stenosis. Mild AI  Tricuspid Valve: Severe TR.  Pulmonic Valve: Mild PI  Left Atrium: normal  Right Atrium: Not well-visualized  Left Ventricle: normal LV size and systolic function, estimated LVEF of 65%.  Right Ventricle: Grossly normal size and systolic function.  Pericardium/Pleura: normal, no significant pericardial effusion.  Pulmonary/RV Pressure: estimated PA systolic pressure of 100 mmHg consistent with severe pulmonary hypertension  LV diastolic dysfunction is present    Conclusion:   Technically difficult study  Normal left ventricular internal dimensions and systolic function, estimated LVEF of 65%.   Grossly normal RV size and systolic function.    Calcified trileaflet aortic valve with likely mild aortic stenosis, mild AI.   Trace physiologic MR   Severe TR.    Estimated PA systolic pressure of 100 mmHg consistent with severe pulmonary hypertension  No significant pericardial effusion.    < end of copied text >    < from: CT Angio Chest w/ IV Cont (04.28.20 @ 08:58) >  FINDINGS:  LUNGS AND AIRWAYS: Patent central airways.  Severe bilateral interstitial fibrosis with subpleural predominance. Nonspecific dependent groundglass opacities at the lung bases could represent inflammatory/infectious process and/or pulmonary edema. Correlate clinically for active infection. Biapical scarring. No bryan lobar consolidation.  PLEURA: Trace left pleural effusion.  MEDIASTINUM AND TRINI: No lymphadenopathy.  VESSELS: Negative for pulmonary emboli. Prominent pulmonary arteries concerning for pulmonary hypertension. Atherosclerotic nonaneurysmal thoracic aorta..  HEART: Cardiomegaly with coronary artery calcification No pericardial effusion.  CHEST WALL AND LOWER NECK: Within normal limits.  VISUALIZED UPPER ABDOMEN: 4 mm nonobstructive left renal calculus.  BONES: Senescent/degenerative changes. Age-indeterminate mild superior endplate compression deformity upper thoracic vertebral body probably chronic. A nonspecific sclerotic focus involving a midthoracic vertebral body..    IMPRESSION:   Negative for pulmonary emboli.  Severe interstitial fibrosis.  Nonspecific dependent groundglass opacity at the lung bases may reflect inflammatory/infectious process and/or pulmonary edema.  Additional findings as discussed    < end of copied text >    < from: Xray Chest 1 View-PORTABLE IMMEDIATE (04.27.20 @ 12:57) >  Findings:  Heart magnified by projection. Atherosclerotic aorta. There is severe diffuse bilateral nearly symmetric interstitial fibrosis. No definite focal infiltrate. No bryan parenchymal consolidation or pleural effusion.    Impression:  1.  Severe bilateral interstitial fibrosis without gross focal infiltrate.    DISCRETE X-RAY DATA:  Percent of LEFT lung opacification: %  Percent of RIGHT lung opacification: %  Change in lung opacification from most recent x-ray (<=3 days): No Prior  Change from prior dated 3 or more days (same admission): No Prior    < end of copied text >

## 2020-05-01 NOTE — PROGRESS NOTE ADULT - ASSESSMENT
89 y/o f PMHx of pulmonary fibrosis, pulmonary HTN, DM2, AS & MR, and constipation, BIBEMS to ED with right hip pain in setting of recent unwitnessed fall. Found to have R Hip fx. Cardiology is being consulted for clearance    Right hip fracture   - Surgery cancelled   - TTE showed EF 65%, shows Nl LV, mild AS, mild AI, trace MR, severe TR, severe Pul HTN  - Management per ortho and primary    Pulmonary HTN   - Continue nebs  - Titrate O2 as tolerated    Diabetes Mellitus  - Monitor blood sugars ac and hs  - Management per primary       - Monitor and replete lytes, keep K>4, Mg>2.  - All other medical needs as per primary team.  - Other cardiovascular workup will depend on clinical course.  - Will continue to follow.      Mikayla Martinez MS FNP, [facility]  Nurse Practitioner- Cardiology   Spectra #[phone]

## 2020-05-01 NOTE — PROGRESS NOTE ADULT - REASON FOR ADMISSION
Right hip injury

## 2020-05-01 NOTE — ADVANCED PRACTICE NURSE CONSULT - RECOMMEDATIONS
Type2 A1c 5.9% s/p fall BG <100 mg/dL  Case discussed with Dr. Mcdonald  Dc'd insulin coverage and humulin R on med rec not required.  Goal 100-180 mg/dL

## 2020-05-01 NOTE — PROGRESS NOTE ADULT - PROBLEM SELECTOR PROBLEM 1
Closed fracture of right hip, initial encounter

## 2020-05-01 NOTE — DISCHARGE NOTE NURSING/CASE MANAGEMENT/SOCIAL WORK - PATIENT PORTAL LINK FT
You can access the FollowMyHealth Patient Portal offered by [facility] by registering at the following website: [email or web address]. By joining [employer]’s FollowMyHealth portal, you will also be able to view your health information using other applications (apps) compatible with our system.

## 2020-05-01 NOTE — DISCHARGE NOTE PROVIDER - PROVIDER TOKENS
PROVIDER:[TOKEN:[[id number]],FOLLOWUP:[2 weeks]],PROVIDER:[TOKEN:[[id number]],FOLLOWUP:[2 weeks]],PROVIDER:[TOKEN:[[id number]],FOLLOWUP:[2 weeks]]

## 2020-05-01 NOTE — DISCHARGE NOTE NURSING/CASE MANAGEMENT/SOCIAL WORK - NSDCPNINST_GEN_ALL_CORE
Any difficulty breathing, severe pain , swelling of the leg and discoloration of toes go to the nearest emergency room

## 2020-05-01 NOTE — PROGRESS NOTE ADULT - ATTENDING COMMENTS
I personally saw and examined the patient in detail.  I have spoken to the above provider regarding the assessment and plan of care.  I reviewed the above assessment and plan of care, and agree.  I have made changes in the body of the note where appropriate.
The patient was personally seen and examined, in addition to being examined and evaluated by NP.  All elements of the note were edited where appropriate.
Chart reviewed     Patient seen and examined    Agree with plan as outlined above
Chart reviewed    Patient seen and examined    Agree with plan as outlined above

## 2020-05-01 NOTE — DISCHARGE NOTE PROVIDER - CARE PROVIDER_API CALL
Dwayne Trmible)  Critical Care Medicine; HospicePalliative Medicine; Internal Medicine; Pulmonary Disease  [address] [place] [address]  Phone: [phone]  Fax: [phone]  Follow Up Time: 2 weeks    Asim Barillas)  Cardiovascular Disease  [address] [place] [address]  Phone: [phone]  Fax: [phone]  Follow Up Time: 2 weeks    Dixon Colon ()  Internal Medicine  [address] [place] [address]  Phone: [phone]  Fax: [phone]  Follow Up Time: 2 weeks

## 2020-06-03 NOTE — PROGRESS NOTE ADULT - ASSESSMENT
Reason for Call:  Form, our goal is to have forms completed with 72 hours, however, some forms may require a visit or additional information.    Type of letter, form or note:  medical    Who is the form from?: Home care    Where did the form come from: form was faxed in    What clinic location was the form placed at?: [facility] - [phone]    Where the form was placed: team a Box/Folder    What number is listed as a contact on the form?: [phone]       Additional comments: please sign and fax back to: [phone]    Call taken on 6/3/2020 at 2:40 PM by Lorena Wilson         89 y/o f PMHx of pulmonary fibrosis, pulmonary HTN, DM2, AS & MR, and constipation, BIBEMS to ED with right hip pain in setting of recent unwitnessed fall. Found to have R Hip fx Cardiology is being consulted for clearance      She is at increased risk for her planned surgery given her comorbidities including at least moderate aortic stenosis and pulmonary fibrosis. She has no evidence for active ischemia, heart failure, or dysrhythmias. She has no modifiable risk factors and is in optimal condition for her planned surgery    Proceed with planned surgery using routine hemodynamic monitoring    TTE pending     -Monitor and replete lytes, keep K>4 and Mg >2  - Further cardiac workup will depend on clinical course.   - All other workup per primary team  Thank you for the consult  Will continue to follow  Sonido Mondragon [facility]  Cardiology   Spectra #[phone] 89 y/o f PMHx of pulmonary fibrosis, pulmonary HTN, DM2, AS & MR, and constipation, BIBEMS to ED with right hip pain in setting of recent unwitnessed fall. Found to have R Hip fx Cardiology is being consulted for clearance      She is at increased risk for her planned surgery given her comorbidities including at least moderate aortic stenosis and pulmonary fibrosis. She has no evidence for active ischemia, heart failure, or dysrhythmias. She has no modifiable risk factors and is in optimal condition for her planned surgery    Prelim TTE shows Nl LV mild AS, Mor TR, Pul HTN  Proceed with planned surgery using routine hemodynamic monitoring    TTE pending     -Monitor and replete lytes, keep K>4 and Mg >2  - Further cardiac workup will depend on clinical course.   - All other workup per primary team  Thank you for the consult  Will continue to follow  Sonido Mondragon [facility]  Cardiology   Spectra #[phone]

## 2022-07-30 NOTE — ED ADULT NURSE NOTE - PRO INTERPRETER NEED 2
Pt c/o a posterior headache which onset this morning. Pt's caregiver states she was seen here yesterday for eval of vision and balance issues, work up was negative. English No
